# Patient Record
Sex: FEMALE | Race: WHITE
[De-identification: names, ages, dates, MRNs, and addresses within clinical notes are randomized per-mention and may not be internally consistent; named-entity substitution may affect disease eponyms.]

---

## 2019-09-03 ENCOUNTER — HOSPITAL ENCOUNTER (OUTPATIENT)
Dept: HOSPITAL 75 - RAD | Age: 84
End: 2019-09-03
Attending: NURSE PRACTITIONER
Payer: MEDICARE

## 2019-09-03 DIAGNOSIS — M47.816: Primary | ICD-10-CM

## 2019-09-03 DIAGNOSIS — M47.814: ICD-10-CM

## 2019-09-03 PROCEDURE — 72072 X-RAY EXAM THORAC SPINE 3VWS: CPT

## 2019-09-03 PROCEDURE — 72100 X-RAY EXAM L-S SPINE 2/3 VWS: CPT

## 2019-12-03 ENCOUNTER — HOSPITAL ENCOUNTER (OUTPATIENT)
Dept: HOSPITAL 75 - RAD | Age: 84
End: 2019-12-03
Attending: INTERNAL MEDICINE
Payer: MEDICARE

## 2019-12-03 DIAGNOSIS — M81.0: Primary | ICD-10-CM

## 2019-12-03 PROCEDURE — 77080 DXA BONE DENSITY AXIAL: CPT

## 2019-12-03 NOTE — DIAGNOSTIC IMAGING REPORT
INDICATION: 84-year-old postmenopausal female.



COMPARISON: None



FINDINGS:



AP Spine L1-L4:  

[BMD (g/cm2): 0.832] [T-Score: -3.1] [Z-Score: -0.7]

[BMD Previous: N/A] [BMD % Change: N/A]



LT Hip Neck:       

[BMD (g/cm2): 0.770] [T-Score: -1.9] [Z-Score: 0.8]



LT Hip Total:       

[BMD (g/cm2):0.821] [T-Score:-1.5] [Z-Score: 1.1]

[BMD Previous: N/A] [BMD % Change: N/A]



RT Hip Neck:      

[BMD (g/cm2):0.819] [T-Score:-1.6] [Z-Score:1.1]



RT Hip Total:      

[BMD (g/cm2):0.786] [T-score:-1.8] [Z-Score:0.9]

[BMD Previous:N/A] [BMD % Change:N/A]



*Indicates significant change from prior examination based on 95%

confidence level.



World Health Organization criteria for BMD interpretation

classify patients as Normal (T-score at or above -1.0),

Osteopenic (T-score between -1.0 and -2.5) or Osteoporotic

(T-score at or below -2.5).



LIMITATIONS AND MODIFICATION:  None.





IMPRESSION:

1. Osteoporosis.

2. Baseline examination.

3. See below National Osteoporosis Foundation guidelines on when

to potentially initiate pharmacologic therapy. 



Based on the National Osteoporosis Foundation Guidelines,

pharmacologic treatment should be initiated in any of the

following, unless clinical conditions suggest otherwise:



*  Any patient with prior fragility fracture of the hip or

vertebrae. A spine fracture indicates 5X risk for subsequent

spine fracture and 2X risk for subsequent hip fracture.



*  Osteoporosis (T-score <-2.5).



*  Postmenopausal women and men age 50 and older with low bone

mass/osteopenia (T-score between -1.0 and -2.5) by DXA and

10-year major osteoporotic fracture greater than 20% or a 10-year

probability of hip fracture greater than 3%. These fracture risks

are supplied above in the FRAX score, if applicable.



*  Clinician judgement and/or patient preferences may indicate

treatment for people with 10-year fracture probabilities above or

below these levels.



Dictated by: 



  Dictated on workstation # [id number]

## 2020-01-09 ENCOUNTER — HOSPITAL ENCOUNTER (OUTPATIENT)
Dept: HOSPITAL 75 - RAD | Age: 85
End: 2020-01-09
Attending: INTERNAL MEDICINE
Payer: MEDICARE

## 2020-01-09 DIAGNOSIS — F29: ICD-10-CM

## 2020-01-09 DIAGNOSIS — R41.82: ICD-10-CM

## 2020-01-09 DIAGNOSIS — I63.81: Primary | ICD-10-CM

## 2020-01-09 DIAGNOSIS — R25.1: ICD-10-CM

## 2020-01-09 DIAGNOSIS — G31.89: ICD-10-CM

## 2020-01-09 DIAGNOSIS — M81.0: ICD-10-CM

## 2020-01-09 PROCEDURE — 70450 CT HEAD/BRAIN W/O DYE: CPT

## 2020-01-09 NOTE — DIAGNOSTIC IMAGING REPORT
INDICATION: Tremors and altered mental status.



TECHNIQUE: Multiple contiguous axial images were obtained through

the brain without the use of intravenous contrast. Auto Exposure

Controls were utilized during the CT exam to meet ALARA standards

for radiation dose reduction. 



COMPARISON: Comparison made with 12/31/2018.



FINDINGS: Diffuse atrophic changes are again noted. There are

patchy low-density changes in the deep white matter compatible

with chronic ischemic change. There is an old lacunar infarct in

the left caudate nucleus. There is no definite acute appearing

abnormality. Ventricles are normal in size and position.

Calvarial windows appear unremarkable. Visualized portions of

orbits and sinuses are normal.



IMPRESSION:

Chronic findings as above with no acute abnormality.



Dictated by: 



  Dictated on workstation # [id number]

## 2020-10-25 ENCOUNTER — HOSPITAL ENCOUNTER (EMERGENCY)
Dept: HOSPITAL 75 - ER | Age: 85
LOS: 1 days | Discharge: HOME | End: 2020-10-26
Payer: MEDICARE

## 2020-10-25 VITALS — BODY MASS INDEX: 19.53 KG/M2 | HEIGHT: 62.99 IN | WEIGHT: 110.23 LBS

## 2020-10-25 DIAGNOSIS — Z83.3: ICD-10-CM

## 2020-10-25 DIAGNOSIS — R41.0: Primary | ICD-10-CM

## 2020-10-25 DIAGNOSIS — Z79.82: ICD-10-CM

## 2020-10-25 DIAGNOSIS — Z88.8: ICD-10-CM

## 2020-10-25 LAB
ALBUMIN SERPL-MCNC: 3.5 GM/DL (ref 3.2–4.5)
ALP SERPL-CCNC: 49 U/L (ref 40–136)
ALT SERPL-CCNC: 31 U/L (ref 0–55)
APTT BLD: 27 SEC (ref 24–35)
APTT PPP: YELLOW S
BACTERIA #/AREA URNS HPF: NEGATIVE /HPF
BASOPHILS # BLD AUTO: 0 10^3/UL (ref 0–0.1)
BASOPHILS NFR BLD AUTO: 0 % (ref 0–10)
BILIRUB SERPL-MCNC: 0.5 MG/DL (ref 0.1–1)
BILIRUB UR QL STRIP: NEGATIVE
BUN/CREAT SERPL: 25
CALCIUM SERPL-MCNC: 8.8 MG/DL (ref 8.5–10.1)
CHLORIDE SERPL-SCNC: 105 MMOL/L (ref 98–107)
CO2 SERPL-SCNC: 23 MMOL/L (ref 21–32)
CREAT SERPL-MCNC: 1.11 MG/DL (ref 0.6–1.3)
D DIMER PPP FEU-MCNC: 0.57 UG/ML (ref 0–0.49)
EOSINOPHIL # BLD AUTO: 0.2 10^3/UL (ref 0–0.3)
EOSINOPHIL NFR BLD AUTO: 2 % (ref 0–10)
FIBRINOGEN PPP-MCNC: (no result) MG/DL
GFR SERPLBLD BASED ON 1.73 SQ M-ARVRAT: 47 ML/MIN
GLUCOSE SERPL-MCNC: 102 MG/DL (ref 70–105)
GLUCOSE UR STRIP-MCNC: NEGATIVE MG/DL
HCT VFR BLD CALC: 37 % (ref 35–52)
HGB BLD-MCNC: 12.1 G/DL (ref 11.5–16)
INR PPP: 1 (ref 0.8–1.4)
KETONES UR QL STRIP: NEGATIVE
LEUKOCYTE ESTERASE UR QL STRIP: (no result)
LYMPHOCYTES # BLD AUTO: 4.2 10^3/UL (ref 1–4)
LYMPHOCYTES NFR BLD AUTO: 48 % (ref 12–44)
MANUAL DIFFERENTIAL PERFORMED BLD QL: YES
MCH RBC QN AUTO: 29 PG (ref 25–34)
MCHC RBC AUTO-ENTMCNC: 32 G/DL (ref 32–36)
MCV RBC AUTO: 90 FL (ref 80–99)
MONOCYTES # BLD AUTO: 1 10^3/UL (ref 0–1)
MONOCYTES NFR BLD AUTO: 11 % (ref 0–12)
NEUTROPHILS # BLD AUTO: 3.4 10^3/UL (ref 1.8–7.8)
NEUTROPHILS NFR BLD AUTO: 39 % (ref 42–75)
NITRITE UR QL STRIP: NEGATIVE
PH UR STRIP: 6 [PH] (ref 5–9)
PLATELET # BLD: 186 10^3/UL (ref 130–400)
PMV BLD AUTO: 8.8 FL (ref 9–12.2)
POTASSIUM SERPL-SCNC: 3.8 MMOL/L (ref 3.6–5)
PROT SERPL-MCNC: 6.5 GM/DL (ref 6.4–8.2)
PROT UR QL STRIP: NEGATIVE
PROTHROMBIN TIME: 13.7 SEC (ref 12.2–14.7)
RBC #/AREA URNS HPF: (no result) /HPF
SODIUM SERPL-SCNC: 139 MMOL/L (ref 135–145)
SP GR UR STRIP: 1.02 (ref 1.02–1.02)
SQUAMOUS #/AREA URNS HPF: (no result) /HPF
WBC # BLD AUTO: 8.8 10^3/UL (ref 4.3–11)
WBC #/AREA URNS HPF: (no result) /HPF

## 2020-10-25 PROCEDURE — 85730 THROMBOPLASTIN TIME PARTIAL: CPT

## 2020-10-25 PROCEDURE — 85610 PROTHROMBIN TIME: CPT

## 2020-10-25 PROCEDURE — 84484 ASSAY OF TROPONIN QUANT: CPT

## 2020-10-25 PROCEDURE — 51701 INSERT BLADDER CATHETER: CPT

## 2020-10-25 PROCEDURE — 36415 COLL VENOUS BLD VENIPUNCTURE: CPT

## 2020-10-25 PROCEDURE — 93005 ELECTROCARDIOGRAM TRACING: CPT

## 2020-10-25 PROCEDURE — 85027 COMPLETE CBC AUTOMATED: CPT

## 2020-10-25 PROCEDURE — 71045 X-RAY EXAM CHEST 1 VIEW: CPT

## 2020-10-25 PROCEDURE — 81000 URINALYSIS NONAUTO W/SCOPE: CPT

## 2020-10-25 PROCEDURE — 85379 FIBRIN DEGRADATION QUANT: CPT

## 2020-10-25 PROCEDURE — 85007 BL SMEAR W/DIFF WBC COUNT: CPT

## 2020-10-25 PROCEDURE — 93041 RHYTHM ECG TRACING: CPT

## 2020-10-25 PROCEDURE — 70450 CT HEAD/BRAIN W/O DYE: CPT

## 2020-10-25 PROCEDURE — 80053 COMPREHEN METABOLIC PANEL: CPT

## 2020-10-25 NOTE — ED NEUROLOGICAL PROBLEM
General


Chief Complaint:  Neurological Problems


Stated Complaint:  UPPER BODY/FACIAL NUMBNESS


Nursing Triage Note:  


PT TO ED W/ C/O UPPER LIP ET BILAT HAND NUMBNESS ONSET TODAY.  PER CARETAKER, PT




ALSO VOICED SOME CONFUSION TO AS SHE DID NOT KNOW WHERE THE BATHROOM WAS IN HER 


HOME OF 8 YRS.  PT AWAKE, A/OX3 AT THIS TIME, ANSWERS ALL QUESTIONS 


APPROPRIATELY AT THIS TIME.  NO OTHER C/O VOICED.


Nursing Sepsis Screen:  No Definite Risk


Source:  patient


Exam Limitations:  no limitations





History of Present Illness


Date Seen by Provider:  Oct 25, 2020


Time Seen by Provider:  23:15


Initial Comments


Here with caregiver reports that the patient had reported numbness to her face 

and left hand today but also has had some increased confusion over the past day 

or 2. Noted to have blood pressure elevation that is not typical for her. Blood 

pressure has been 150s to 170s. She is not on antihypertensive medicines and 

usually does not have high blood pressure. Patient lives at home with caregivers

and ambulates via walker. She was able to ambulate to the room under her own 

power using the walker without steadiness difficulties. Patient denies chest 

pain, breathing problems, nausea, vomiting or diarrhea. Caregiver reports that 

she has not been eating well but that is not a new issue. Did have urine checked

a week ago that was negative and she is currently being treated with doxycycline

for an eye infection.


Timing/Duration:  24 hours, waxing and waning


Severity:  mild


Associated Symptoms:  No fever/chills, No nausea/vomiting; paresthesia; No 

seizures, No trouble walking, No weakness





Allergies and Home Medications


Allergies


Coded Allergies:  


     quetiapine (Verified  Adverse Reaction, Unknown, 2/11/19)


   


   confusion





Home Medications


Acetaminophen 650 Mg Tablet.er, 650 MG PO BID, (Reported)


Amoxicillin/Potassium Clav 1 Each Tablet, 1 EACH PO BID


   Prescribed by: JASON RODRIGUEZ on 1/3/19 0942


Aspirin 81 Mg Tablet.dr, 81 MG PO DAILY, (Reported)


Cefuroxime Axetil 250 Mg Tablet, 250 MG PO BID


   Prescribed by: DANELLE MENENDEZ on 2/11/19 1827


Escitalopram Oxalate 10 Mg Tablet, 10 MG PO DAILY, (Reported)


Hydrocodone/Acetaminophen 1 Each Tablet, 1 EACH PO Q6H PRN for PAIN-MODERATE


   Prescribed by: DANELLE MENENDEZ on 2/11/19 1737


Omega 3 Polyunsat Fatty Acids 1,000 Mg Cap, 1,000 MG PO DAILY, (Reported)





Patient Home Medication List


Home Medication List Reviewed:  Yes





Review of Systems


Review of Systems


Constitutional:  see HPI; No chills, No fever


Eyes:  See HPI, Decreased Acuity (chronic); Denies Pain


Ears, Nose, Mouth, Throat:  no symptoms reported


Respiratory:  no symptoms reported


Cardiovascular:  No chest pain, No edema


Gastrointestinal:  No abdominal pain, No nausea, No vomiting


Genitourinary:  no symptoms reported (seen 01 home it more theoretical that was)


Skin:  no symptoms reported


Psychiatric/Neurological:  See HPI, Numbness, Tingling





All Other Systems Reviewed


Negative Unless Noted:  Yes





Past Medical-Social-Family Hx


Past Med/Social Hx:  Reviewed Nursing Past Med/Soc Hx (doing well)


Patient Social History


Alcohol Use:  Denies Use


Recreational Drug Use:  No


Smoking Status:  Never a Smoker


Recent Foreign Travel:  No


Contact w/Someone Who Travel:  No


Recent Infectious Disease Expo:  No


Recent Hopitalizations:  No


Physical Abuse:  No


Sexual Abuse:  No


Mistreated:  No


Fear:  No





Immunizations Up To Date


Tetanus Booster (TDap):  Unknown


PED Vaccines UTD:  No


Date of Pneumonia Vaccine:  Oct 1, 2017


Date of Influenza Vaccine:  Oct 1, 2017





Seasonal Allergies


Seasonal Allergies:  No





Past Medical History


Surgeries:  No


Respiratory:  No


Currently Using CPAP:  No


Currently Using BIPAP:  No


Cardiac:  Yes (SEES DR. BUSBY UNSURE WHY)


Hypertension


Neurological:  Yes


Dementia


Reproductive Disorders:  No


Female Reproductive Disorders:  Denies


Sexually Transmitted Disease:  No


HIV/AIDS:  No


Genitourinary:  Yes


UTI-Chronic


Gastrointestinal:  No


Musculoskeletal:  Yes


Fractures, Gout


Endocrine:  Yes


Hyperthyroidism


HEENT:  Yes


Cataract


Loss of Vision:  Denies


Hearing Impairment:  Hard of Hearing


Cancer:  No


Psychosocial:  No


Integumentary:  No


Recent Skin Changes


Blood Disorders:  No


Adverse Reaction/Blood Tranf:  No





Family Medical History


Reviewed Nursing Family Hx





Dementia


  G8 SISTER


Diabetes mellitus


  19 FATHER


No Pertinent Family Hx, Diabetes, Psychiatric Problems





Physical Exam


Vital Signs





Vital Signs - First Documented








 10/25/20





 23:08


 


Temp 35.8


 


Pulse 83


 


Resp 16


 


B/P (MAP) 192/81 (118)


 


Pulse Ox 98


 


O2 Delivery Room Air





Capillary Refill : Less Than 3 Seconds


Height, Weight, BMI


Height: 5'4.00"


Weight: 117lbs. 8.0oz. 53.741597pl; 19.00 BMI


Method:Stated


General Appearance:  WD/WN, no apparent distress, thin


HEENT:  PERRL/EOMI, pharynx normal


Neck:  full range of motion, supple


Respiratory:  lungs clear, normal breath sounds


Cardiovascular:  regular rate, rhythm, no murmur


Gastrointestinal:  non tender, soft


Extremities:  non-tender, normal inspection


Neurologic/Psychiatric:  alert, oriented x 3


Crainal Nerves:  normal speech


Coordination/Gait:  normal finger to nose, normal gait


Motor/Sensory:  no motor deficit, other (tingling reported to the left hand and 

upper lip but otherwise sensation intact and equal throughout the rest of the 

body)


Skin:  normal color, warm/dry





Progress/Results/Core Measures


Results/Orders


Lab Results





Laboratory Tests








Test


 10/25/20


23:16 10/25/20


23:23 Range/Units


 


 


White Blood Count


 8.8 


 


 4.3-11.0


10^3/uL


 


Red Blood Count


 4.17 


 


 3.80-5.11


10^6/uL


 


Hemoglobin 12.1   11.5-16.0  g/dL


 


Hematocrit 37   35-52  %


 


Mean Corpuscular Volume 90   80-99  fL


 


Mean Corpuscular Hemoglobin 29   25-34  pg


 


Mean Corpuscular Hemoglobin


Concent 32 


 


 32-36  g/dL





 


Red Cell Distribution Width 14.4   10.0-14.5  %


 


Platelet Count


 186 


 


 130-400


10^3/uL


 


Mean Platelet Volume 8.8 L  9.0-12.2  fL


 


Immature Granulocyte % (Auto) 0    %


 


Neutrophils (%) (Auto) 39 L  42-75  %


 


Lymphocytes (%) (Auto) 48 H  12-44  %


 


Monocytes (%) (Auto) 11   0-12  %


 


Eosinophils (%) (Auto) 2   0-10  %


 


Basophils (%) (Auto) 0   0-10  %


 


Neutrophils # (Auto)


 3.4 


 


 1.8-7.8


10^3/uL


 


Lymphocytes # (Auto)


 4.2 H


 


 1.0-4.0


10^3/uL


 


Monocytes # (Auto)


 1.0 


 


 0.0-1.0


10^3/uL


 


Eosinophils # (Auto)


 0.2 


 


 0.0-0.3


10^3/uL


 


Basophils # (Auto)


 0.0 


 


 0.0-0.1


10^3/uL


 


Immature Granulocyte # (Auto)


 0.0 


 


 0.0-0.1


10^3/uL


 


Neutrophils % (Manual) 39    %


 


Lymphocytes % (Manual) 41    %


 


Monocytes % (Manual) 12    %


 


Atypical Lymphocytes 8    %


 


Blood Morphology Comment NORMAL    


 


Prothrombin Time 13.7   12.2-14.7  SEC


 


INR Comment 1.0   0.8-1.4  


 


Activated Partial


Thromboplast Time 27 


 


 24-35  SEC





 


D-Dimer


 0.57 H


 


 0.00-0.49


UG/ML


 


Sodium Level 139   135-145  MMOL/L


 


Potassium Level 3.8   3.6-5.0  MMOL/L


 


Chloride Level 105     MMOL/L


 


Carbon Dioxide Level 23   21-32  MMOL/L


 


Anion Gap 11   5-14  MMOL/L


 


Blood Urea Nitrogen 28 H  7-18  MG/DL


 


Creatinine


 1.11 


 


 0.60-1.30


MG/DL


 


Estimat Glomerular Filtration


Rate 47 


 


  





 


BUN/Creatinine Ratio 25    


 


Glucose Level 102     MG/DL


 


Calcium Level 8.8   8.5-10.1  MG/DL


 


Corrected Calcium 9.2   8.5-10.1  MG/DL


 


Total Bilirubin 0.5   0.1-1.0  MG/DL


 


Aspartate Amino Transf


(AST/SGOT) 24 


 


 5-34  U/L





 


Alanine Aminotransferase


(ALT/SGPT) 31 


 


 0-55  U/L





 


Alkaline Phosphatase 49     U/L


 


Troponin I < 0.028   <0.028  NG/ML


 


Total Protein 6.5   6.4-8.2  GM/DL


 


Albumin 3.5   3.2-4.5  GM/DL


 


Urine Color  YELLOW   


 


Urine Clarity  SL CLOUDY   


 


Urine pH  6.0  5-9  


 


Urine Specific Gravity  1.025 H 1.016-1.022  


 


Urine Protein  NEGATIVE  NEGATIVE  


 


Urine Glucose (UA)  NEGATIVE  NEGATIVE  


 


Urine Ketones  NEGATIVE  NEGATIVE  


 


Urine Nitrite  NEGATIVE  NEGATIVE  


 


Urine Bilirubin  NEGATIVE  NEGATIVE  


 


Urine Urobilinogen  0.2  < = 1.0  MG/DL


 


Urine Leukocyte Esterase  TRACE H NEGATIVE  


 


Urine RBC (Auto)  TRACE-I  NEGATIVE  


 


Urine RBC  0-2   /HPF


 


Urine WBC  2-5   /HPF


 


Urine Squamous Epithelial


Cells 


 2-5 


  /HPF





 


Urine Crystals  NONE   /LPF


 


Urine Bacteria  NEGATIVE   /HPF


 


Urine Casts  NONE   /LPF


 


Urine Mucus  NEGATIVE   /LPF


 


Urine Culture Indicated  NO   








My Orders





Orders - NARENDRA ROSAS MD


Cbc With Automated Diff (10/25/20 23:19)


Protime With Inr (10/25/20 23:19)


Partial Thromboplastin Time (10/25/20 23:19)


Comprehensive Metabolic Panel (10/25/20 23:19)


Fibrin Degradation Products (10/25/20 23:19)


Troponin I (10/25/20 23:19)


Ua Culture If Indicated (10/25/20 23:19)


Chest 1 View, Ap/Pa Only (10/25/20 23:19)


Ekg Tracing (10/25/20 23:19)


Nothing By Mouth (10/26/20 Breakfast)


Accucheck Stat ONCE (10/25/20 23:19)


Ed Iv/Invasive Line Start (10/25/20 23:19)


Vital Signs Stroke Patient Q15M (10/25/20 23:19)


Ct Head Wo-R/O Stroke (10/25/20 23:19)


O2 (10/25/20 23:19)


Intake & Output 06,14,22 (10/25/20 23:19)


Monitor-Rhythm Ecg Trace Only (10/25/20 23:19)


Dysphagia Screening Tool (10/25/20 23:19)


Lipid Panel (10/26/20 06:00)


Ns Iv 500 Ml (Sodium Chloride 0.9%) (10/25/20 23:19)


Straight Cath For Spec.-Adult (10/25/20 23:19)


Manual Differential (10/25/20 23:16)





Medications Given in ED





Current Medications








 Medications  Dose


 Ordered  Sig/Familia


 Route  Start Time


 Stop Time Status Last Admin


Dose Admin


 


 Sodium Chloride  500 ml @ 0


 mls/hr  Q0M ONCE


 IV  10/25/20 23:19


 10/25/20 23:23 DC 10/25/20 23:27


0 MLS/HR








Vital Signs/I&O











 10/25/20





 23:08


 


Temp 35.8


 


Pulse 83


 


Resp 16


 


B/P (MAP) 192/81 (118)


 


Pulse Ox 98


 


O2 Delivery Room Air














Blood Pressure Mean:                    118











Progress


Progress Note :  


Progress Note


Seen and evaluated. IV, labs, EKG and chest x-ray ordered. CT head ordered. We 

did straight catheter UA to rule out urinary tract infection as cause. Normal 

saline 500 mL bolus. Monitor patient.


Initial ECG Impression Date:  Oct 25, 2020


Initial ECG Impression Time:  23:50


Initial ECG Rate:  74


Initial ECG Rhythm:  Normal Sinus


Initial ECG Comparisson:  Unchanged


Comment


Sinus rhythm with leftward axis. No evidence of ST elevation MI. Interpreted by 

me.





Diagnostic Imaging





   Diagonstic Imaging:  CT


   Plain Films/CT/US/NM/MRI:  head


Comments


No acute findings in the head/brain


   Reviewed:  Reviewed [place] Study, Reviewed by Me








   Diagonstic Imaging:  Xray


   Plain Films/CT/US/NM/MRI:  chest


Comments


No acute findings





Departure


Impression





   Primary Impression:  


   Confusion


Disposition:  01 HOME, SELF-CARE


Condition:  Stable





Departure-Patient Inst.


Decision time for Depature:  00:54


Referrals:  


NICOLASA MACHADO DO (PCP/Family)


Primary Care Physician


Patient Instructions:  Paresthesia (DC), Delirium (Confusion) (DC)





Add. Discharge Instructions:  








All discharge instructions reviewed with patient and/or family. Voiced 

understanding.





Continue home medications as previously prescribed. Follow-up with your Dr. in a

few days for recheck. Monitor and record blood pressure twice daily for the next

several days and discuss this with the doctor. Call in the morning for 

appointment. Return for worse pain, weakness, vision or balance problems, fever 

or other concerns as needed.





Copy


Copies To 1:   NICOLASA MACHADO TIMOTHY D MD          Oct 25, 2020 23:49

## 2020-10-26 VITALS — SYSTOLIC BLOOD PRESSURE: 177 MMHG | DIASTOLIC BLOOD PRESSURE: 88 MMHG

## 2020-10-26 LAB
MONOCYTES NFR BLD: 12 %
NEUTS BAND NFR BLD MANUAL: 39 %
RBC MORPH BLD: NORMAL
VARIANT LYMPHS NFR BLD MANUAL: 41 %
VARIANT LYMPHS NFR BLD MANUAL: 8 %

## 2020-10-26 NOTE — DIAGNOSTIC IMAGING REPORT
INDICATION:  Altered mental status and confusion.



Portable AP image of the chest is obtained. Since 02/11/2019,

heart size and pulmonary vascularity remain within normal limits.

There is no evidence of pneumothorax or consolidation. Multiple

old right rib fractures are present.



IMPRESSION: No acute abnormality is detected.



Dictated by: 



  Dictated on workstation # [id number]

## 2020-10-26 NOTE — DIAGNOSTIC IMAGING REPORT
EXAMINATION: CT head without contrast.



TECHNIQUE: Multiple contiguous axial images were obtained through

the brain without the use of intravenous contrast. All CT scans

use one or more of the following dose optimizing techniques:

automated exposure control, MA and/or KvP adjustment based on a

patient size and exam type, or iterative reconstruction. 



HISTORY: Neuro deficit



COMPARISON: CT head 01/09/2020



FINDINGS: 



Mild diffuse cerebral volume loss with proportional enlargement

of the ventricles and sulci. Moderate hypodensities throughout

the supratentorial white matter posterior hemispheres. Chronic

bilateral basal ganglia lacunar infarcts. No acute intracranial

hemorrhage or abnormal extra-axial fluid collections are present.





Calcification of the intracranial ICAs. No hyperdense vessel.



The calvarium is intact. The mastoid air cells are clear. The

visualized paranasal sinuses are clear. Surgical changes from

bilateral cataract repair.



IMPRESSION:



1. No acute intracranial abnormality.

2. Chronic senescent changes.

3. Agree with preliminary interpretation.



Dictated by: 



  Dictated on workstation # [id number]

## 2020-10-26 NOTE — NUR
PT DISCHARGED TO HOME W/ INSTR.  PT TO F/U W/ PCP ET RETURN IF SYMPTOMS CHANGE 
OR GET WORSE.  UNDERSTANDING VOICED.

## 2021-03-17 ENCOUNTER — HOSPITAL ENCOUNTER (OUTPATIENT)
Dept: HOSPITAL 75 - RAD | Age: 86
End: 2021-03-17
Attending: INTERNAL MEDICINE
Payer: MEDICARE

## 2021-03-17 DIAGNOSIS — G31.9: ICD-10-CM

## 2021-03-17 DIAGNOSIS — F29: Primary | ICD-10-CM

## 2021-03-17 DIAGNOSIS — R90.82: ICD-10-CM

## 2021-03-17 PROCEDURE — 70450 CT HEAD/BRAIN W/O DYE: CPT

## 2021-03-17 NOTE — DIAGNOSTIC IMAGING REPORT
PROCEDURE: CT head without contrast.



TECHNIQUE: Multiple contiguous axial images were obtained through

the brain without the use of intravenous contrast. Auto Exposure

Controls were utilized during the CT exam to meet ALARA standards

for radiation dose reduction. 



INDICATION: Visual hallucinations. 



COMPARISON: 10/25/2020. 



FINDINGS: Some old ischemic changes in the left basal ganglia as

well as chronic periventricular white matter small vessel

disease, atherosclerotic calcifications and mild cerebral

cortical atrophy, all identical in appearance to the prior. No

focal or generalized cerebral edema. No hemorrhage and no

evidence for an elevation due to the intracranial pressures.

There is no mass or mass effect. Orbits, sinuses and calvarium

appear nonacute. 



IMPRESSION: Stable chronic findings. 



Dictated by: 



  Dictated on workstation # [id number]

## 2021-04-07 ENCOUNTER — HOSPITAL ENCOUNTER (EMERGENCY)
Dept: HOSPITAL 75 - ER | Age: 86
Discharge: HOME | End: 2021-04-07
Payer: MEDICARE

## 2021-04-07 VITALS — BODY MASS INDEX: 20.31 KG/M2 | HEIGHT: 62.99 IN | WEIGHT: 114.64 LBS

## 2021-04-07 VITALS — DIASTOLIC BLOOD PRESSURE: 88 MMHG | SYSTOLIC BLOOD PRESSURE: 168 MMHG

## 2021-04-07 DIAGNOSIS — S01.01XA: Primary | ICD-10-CM

## 2021-04-07 DIAGNOSIS — Z88.8: ICD-10-CM

## 2021-04-07 DIAGNOSIS — I10: ICD-10-CM

## 2021-04-07 DIAGNOSIS — R40.2410: ICD-10-CM
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
## 2021-04-07 DIAGNOSIS — Z83.3: ICD-10-CM

## 2021-04-07 PROCEDURE — 70450 CT HEAD/BRAIN W/O DYE: CPT

## 2021-04-07 PROCEDURE — 12002 RPR S/N/AX/GEN/TRNK2.6-7.5CM: CPT

## 2021-04-07 NOTE — DIAGNOSTIC IMAGING REPORT
EXAMINATION: CT head without contrast.



TECHNIQUE: Multiple contiguous axial images were obtained through

the brain without the use of intravenous contrast. All CT scans

use one or more of the following dose optimizing techniques:

automated exposure control, MA and/or KvP adjustment based on a

patient size and exam type, or iterative reconstruction. 



HISTORY: Fall.



COMPARISON: 03/17/2021.



FINDINGS: 



The gray-white matter differentiation is normal. No mass effect

or midline shift. 



There is age related cerebral atrophy with ex vacuo dilation of

the ventricles. Periventricular white matter hypoattenuation is

in keeping with chronic small vessel ischemic changes. Basilar

cisterns are patent. There are no intra- or extra-axial fluid

collections. There is no intracranial hemorrhage. 



The orbits are normal. Paranasal sinuses are normal. Mastoid air

cells are clear. No soft tissue abnormality is seen. No osseous

lesions or fractures are seen.



IMPRESSION:



1. No acute intracranial abnormality.



Dictated by: 



  Dictated on workstation # [id number]

## 2021-04-07 NOTE — ED FALL/INJURY
General


Chief Complaint:  Trauma-Non Activation


Stated Complaint:  FELL


Nursing Triage Note:  


SEE TRIAGE





History of Present Illness


Date Seen by Provider:  2021


Time Seen by Provider:  11:50


Initial Comments


86-year-old  female reports she was stooping over to feed her dog when 

she lost her balance and fell backwards landing on her buttocks and incurring a 

laceration to her scalp.  She was home with her son denies loss of 

consciousness, fall unwitnessed.  Her home health arrived shortly after and 

brought her here. Mild soreness on tailbone.  She was able to ambulate without 

difficulty.  She denies any other areas of concern.


Location Injury Occurred:  HOME


Occurred:  just prior to arrival


Severity:  mild


Injuries/Pain Location:  head (Scalp laceration)


Context:  lost balance


Loss of Consciousness:  no loss of consciousness (Fall was not witnessed)


Associated Symptoms (Fall):  Denies Symptoms; No Abdominal Pain, No Chest Pain, 

No Headache, No Lightheadedness, No Muscle Spasms, No Nausea/Vomiting, No Neck 

Pain, No Shortness of Air, No Slurred Speech, No Trouble Walking, No Vision 

Changes





Allergies and Home Medications


Allergies


Coded Allergies:  


     quetiapine (Verified  Adverse Reaction, Unknown, 19)


   confusion





Patient Home Medication List


Home Medication List Reviewed:  Yes





Review of Systems


Review of Systems


Constitutional:  no symptoms reported, see HPI


Eyes:  No Symptoms Reported, See HPI


Ears, Nose, Mouth, Throat:  no symptoms reported, see HPI


Respiratory:  no symptoms reported, see HPI


Cardiovascular:  no symptoms reported, see HPI


Gastrointestinal:  no symptoms reported, see HPI


Genitourinary:  no symptoms reported, see HPI


Pregnant:  No


Musculoskeletal:  no symptoms reported, see HPI


Skin:  see HPI, other (3 cm laceration to scalp, occipital)





All Other Systems Reviewed


Negative Unless Noted:  Yes





Past Medical-Social-Family Hx


Past Med/Social Hx:  Reviewed Nursing Past Med/Soc Hx


Patient Social History


Alcohol Use:  Denies Use


Smoking Status:  Never a Smoker


Recent Infectious Disease Expo:  No


Recent Hopitalizations:  No





Immunizations Up To Date


Tetanus Booster (TDap):  Unknown


PED Vaccines UTD:  No


Date of Pneumonia Vaccine:  Oct 1, 2017


Date of Influenza Vaccine:  Oct 1, 2017





Seasonal Allergies


Seasonal Allergies:  No





Past Medical History


Surgeries:  No


Respiratory:  No


Currently Using CPAP:  No


Currently Using BIPAP:  No


Cardiac:  Yes (SEES DR. BUSBY UNSURE WHY)


Hypertension


Neurological:  Yes


Dementia


Reproductive Disorders:  No


Female Reproductive Disorders:  Denies


Sexually Transmitted Disease:  No


HIV/AIDS:  No


Genitourinary:  Yes


UTI-Chronic


Gastrointestinal:  No


Musculoskeletal:  Yes


Fractures, Gout


Endocrine:  Yes


Hyperthyroidism


HEENT:  Yes


Cataract


Loss of Vision:  Denies


Hearing Impairment:  Hard of Hearing


Cancer:  No


Psychosocial:  No


Integumentary:  No


Recent Skin Changes


Blood Disorders:  No


Adverse Reaction/Blood Tranf:  No





Family Medical History





Dementia


  G8 SISTER


Diabetes mellitus


  19 FATHER


No Pertinent Family Hx, Diabetes, Psychiatric Problems





Physical Exam


Vital Signs





Vital Signs - First Documented








 21





 11:15


 


Temp 36.5


 


Pulse 70


 


Resp 18


 


B/P (MAP) 219/90 (133)


 


Pulse Ox 97





Capillary Refill : Less Than 3 Seconds


Height, Weight, BMI


Height: 5'4.00"


Weight: 117lbs. 8.0oz. 53.719504se; 20.00 BMI


Method:Stated


General Appearance:  WD/WN, no apparent distress


HEENT:  PERRL/EOMI, normal ENT inspection, TMs normal, pharynx normal


Neck:  non-tender, full range of motion, supple


Cardiovascular:  normal peripheral pulses, regular rate, rhythm, no edema


Respiratory:  chest non-tender, lungs clear, normal breath sounds


Gastrointestinal:  normal bowel sounds, non tender, soft


Back:  normal inspection, no CVA tenderness, no vertebral tenderness


Extremities:  normal range of motion, non-tender, normal inspection, no pedal 

edema, no calf tenderness, normal capillary refill, pelvis stable


Neurologic/Psychiatric:  no motor/sensory deficits, alert, normal mood/affect, 

oriented x 3


Skin:  normal color, warm/dry


No active bleeding from laceration to occipital region of scalp.  3 cm in 

length.





McGrath Coma Score


Best Eye Response:  (4) Open Spontaneously


Best Verbal Response:  (5) Oriented


Best Motor Response:  (6) Obeys Commands


Leticia Total:  15





Procedures/Interventions





   Wound Location:  Scalp


   Wound Length (cm):  3


   Wound's Depth, Shape:  superficial


   Wound Explored:  clean


   Irrigated w/ Saline (ccs):  500


   Staple Repair:  Stapler Skin Precise


   Number of Sutures:  5


   Sterile Dressing Applied?:  No


Progress


Wound well approximated, patient tolerated procedure well.





Progress/Results/Core Measures


Results/Orders


My Orders





Orders - TAM YAÑEZ


Ct Head Wo (21 12:22)


Acetaminophen Tablet/Caplet (Tylenol  T (21 12:30)


Dipht,Pertuss(Acell),Tet Adult (Boostrix (21 12:30)





Medications Given in ED





Current Medications








 Medications  Dose


 Ordered  Sig/Familia


 Route  Start Time


 Stop Time Status Last Admin


Dose Admin


 


 Acetaminophen  650 mg  ONCE  ONCE


 PO  21 12:30


 21 12:31 DC 21 12:44


650 MG








Vital Signs/I&O











 21





 11:15


 


Temp 36.5


 


Pulse 70


 


Resp 18


 


B/P (MAP) 219/90 (133)


 


Pulse Ox 97














Blood Pressure Mean:                    133











Diagnostic Imaging





   Diagonstic Imaging:  CT


   Plain Films/CT/US/NM/MRI:  head


Comments


NAME:   DARRION GARCIA


[facility] REC#:   [id number]


ACCOUNT#:   [id number]


PT STATUS:   REG ER


:   1935


PHYSICIAN:   TAM YAÑEZ


ADMIT DATE:   21/ER


                                   ***Draft***


Date of Exam:21





CT HEAD WO








EXAMINATION: CT head without contrast.





TECHNIQUE: Multiple contiguous axial images were obtained through


the brain without the use of intravenous contrast. All CT scans


use one or more of the following dose optimizing techniques:


automated exposure control, MA and/or KvP adjustment based on a


patient size and exam type, or iterative reconstruction. 





HISTORY: Fall.





COMPARISON: 2021.





FINDINGS: 





The gray-white matter differentiation is normal. No mass effect


or midline shift. 





There is age related cerebral atrophy with ex vacuo dilation of


the ventricles. Periventricular white matter hypoattenuation is


in keeping with chronic small vessel ischemic changes. Basilar


cisterns are patent. There are no intra- or extra-axial fluid


collections. There is no intracranial hemorrhage. 





The orbits are normal. Paranasal sinuses are normal. Mastoid air


cells are clear. No soft tissue abnormality is seen. No osseous


lesions or fractures are seen.





IMPRESSION:





1. No acute intracranial abnormality.





  Dictated on workstation # [id number]








Dict:   21 1245


Trans:   21 1249


AS6 [id number]





Interpreted by:     MICHAEL ASENCIO MD


Electronically signed by:


   Reviewed:  Reviewed by Me





Departure


Impression





   Primary Impression:  


   Fall


   Qualified Codes:  W19.XXXA - Unspecified fall, initial encounter


   Additional Impression:  


   Laceration of scalp


   Qualified Codes:  S01.01XA - Laceration without foreign body of scalp, 

   initial encounter


Disposition:  01 HOME, SELF-CARE


Condition:  Improved





Departure-Patient Inst.


Decision time for Depature:  13:05


Referrals:  


NICOLASA MACHADO DO (PCP/Family)


Primary Care Physician


Patient Instructions:  Laceration Repair With Edgar (DC), Preventing Falls





Add. Discharge Instructions:  


Use assistance when needing to perform tasks that require bending over or 

stooping.


You may alternate between Tylenol 650 mg and ibuprofen 600 mg every 4 hours for 

headache or pain.


Progress activity as tolerated.


You may shower as normal, clean the area where the staples are with peroxide af

ter bathing.  Do not apply any ointments or other products to the staple area.


Watch for signs of infection: Redness, swelling, discolored drainage, foul 

smell.  Report these immediately to your primary care provider or return to the 

emergency department


Return to the emergency department in 7 to 10 days or your primary care provider

to have the staples removed.


Return to the emergency department for new, urgent healthcare needs.





All discharge instructions reviewed with patient and/or family. Voiced unders

tanding.











TAM YAÑEZ                   2021 13:05

## 2021-04-11 ENCOUNTER — HOSPITAL ENCOUNTER (EMERGENCY)
Dept: HOSPITAL 75 - ER | Age: 86
Discharge: HOME | End: 2021-04-11
Payer: MEDICARE

## 2021-04-11 VITALS — DIASTOLIC BLOOD PRESSURE: 91 MMHG | SYSTOLIC BLOOD PRESSURE: 158 MMHG

## 2021-04-11 VITALS — WEIGHT: 167.99 LBS | HEIGHT: 60.63 IN | BODY MASS INDEX: 32.13 KG/M2

## 2021-04-11 DIAGNOSIS — I10: ICD-10-CM

## 2021-04-11 DIAGNOSIS — R51.9: Primary | ICD-10-CM

## 2021-04-11 DIAGNOSIS — Z83.3: ICD-10-CM

## 2021-04-11 DIAGNOSIS — Z88.8: ICD-10-CM

## 2021-04-11 DIAGNOSIS — M53.3: ICD-10-CM

## 2021-04-11 LAB
ALBUMIN SERPL-MCNC: 3.7 GM/DL (ref 3.2–4.5)
ALP SERPL-CCNC: 57 U/L (ref 40–136)
ALT SERPL-CCNC: 27 U/L (ref 0–55)
BASOPHILS # BLD AUTO: 0 10^3/UL (ref 0–0.1)
BASOPHILS NFR BLD AUTO: 0 % (ref 0–10)
BILIRUB SERPL-MCNC: 0.6 MG/DL (ref 0.1–1)
BUN/CREAT SERPL: 25
CALCIUM SERPL-MCNC: 9 MG/DL (ref 8.5–10.1)
CHLORIDE SERPL-SCNC: 101 MMOL/L (ref 98–107)
CO2 SERPL-SCNC: 22 MMOL/L (ref 21–32)
CREAT SERPL-MCNC: 1 MG/DL (ref 0.6–1.3)
EOSINOPHIL # BLD AUTO: 0.1 10^3/UL (ref 0–0.3)
EOSINOPHIL NFR BLD AUTO: 1 % (ref 0–10)
GFR SERPLBLD BASED ON 1.73 SQ M-ARVRAT: 53 ML/MIN
GLUCOSE SERPL-MCNC: 158 MG/DL (ref 70–105)
HCT VFR BLD CALC: 40 % (ref 35–52)
HGB BLD-MCNC: 12.5 G/DL (ref 11.5–16)
LYMPHOCYTES # BLD AUTO: 1.6 10^3/UL (ref 1–4)
LYMPHOCYTES NFR BLD AUTO: 19 % (ref 12–44)
MANUAL DIFFERENTIAL PERFORMED BLD QL: NO
MCH RBC QN AUTO: 29 PG (ref 25–34)
MCHC RBC AUTO-ENTMCNC: 31 G/DL (ref 32–36)
MCV RBC AUTO: 92 FL (ref 80–99)
MONOCYTES # BLD AUTO: 0.5 10^3/UL (ref 0–1)
MONOCYTES NFR BLD AUTO: 6 % (ref 0–12)
NEUTROPHILS # BLD AUTO: 6.2 10^3/UL (ref 1.8–7.8)
NEUTROPHILS NFR BLD AUTO: 74 % (ref 42–75)
PLATELET # BLD: 157 10^3/UL (ref 130–400)
PMV BLD AUTO: 8.7 FL (ref 9–12.2)
POTASSIUM SERPL-SCNC: 4.2 MMOL/L (ref 3.6–5)
PROT SERPL-MCNC: 6.8 GM/DL (ref 6.4–8.2)
SODIUM SERPL-SCNC: 136 MMOL/L (ref 135–145)
WBC # BLD AUTO: 8.4 10^3/UL (ref 4.3–11)

## 2021-04-11 PROCEDURE — 85025 COMPLETE CBC W/AUTO DIFF WBC: CPT

## 2021-04-11 PROCEDURE — 72220 X-RAY EXAM SACRUM TAILBONE: CPT

## 2021-04-11 PROCEDURE — 84484 ASSAY OF TROPONIN QUANT: CPT

## 2021-04-11 PROCEDURE — 93005 ELECTROCARDIOGRAM TRACING: CPT

## 2021-04-11 PROCEDURE — 72170 X-RAY EXAM OF PELVIS: CPT

## 2021-04-11 PROCEDURE — 36415 COLL VENOUS BLD VENIPUNCTURE: CPT

## 2021-04-11 PROCEDURE — 70450 CT HEAD/BRAIN W/O DYE: CPT

## 2021-04-11 PROCEDURE — 80053 COMPREHEN METABOLIC PANEL: CPT

## 2021-04-11 NOTE — DIAGNOSTIC IMAGING REPORT
EXAM: SACRUM AND COCCYX



INDICATION: Fall.



COMPARISON: CT abdomen pelvis without contrast 02/11/2019.



FINDINGS: Chronic fracture deformities of the superior and

inferior pubic rami appear similar to the prior CT allowing for

differences in technique. No acute fractures are identified. The

sacrum is obscured by overlying tissues in the AP radiograph. No

fractures identified on the lateral.



IMPRESSION: No acute radiographic findings in the sacrum or

coccyx.



Dictated by: 



  Dictated on workstation # [id number]

## 2021-04-11 NOTE — DIAGNOSTIC IMAGING REPORT
PROCEDURE: CT head without contrast.



TECHNIQUE: Multiple contiguous axial images were obtained through

the brain without the use of intravenous contrast. Auto Exposure

Controls were utilized during the CT exam to meet ALARA standards

for radiation dose reduction. 



INDICATION:  Fall with headache and hypertension 



FINDINGS: There is prominence of the ventricles and sulci. There

is no hydrocephalus or cerebral edema. There is no midline shift

or mass-effect. There is no intracranial mass, hemorrhage, or

extra-axial fluid collection. There is some diffuse decreased

attenuation of the periventricular white matter which is

nonspecific. The visualized paranasal sinuses and mastoid air

cells are clear. There are no regional areas of decreased

attenuation appreciated to suggest an acute CVA.



IMPRESSION: 

1. No acute intracranial process.

2. Age-appropriate atrophy.

3. Decreased attenuation of the periventricular white matter

which is nonspecific, however, likely reflects senescent change

and/or chronic small vessel ischemic disease.



Dictated by: 



  Dictated on workstation # [id number]

## 2021-04-11 NOTE — DIAGNOSTIC IMAGING REPORT
INDICATION: Fall with pain.



FINDINGS: There are chronic-appearing fracture deformities of the

obturator rings bilaterally. Proximal femurs are intact. No acute

fracture or dislocation.



IMPRESSION: No acute fracture or dislocation.



Old fracture deformities of the obturator rings bilaterally.



Dictated by: 



  Dictated on workstation # [id number]

## 2021-05-25 ENCOUNTER — HOSPITAL ENCOUNTER (OUTPATIENT)
Dept: HOSPITAL 75 - RAD | Age: 86
End: 2021-05-25
Attending: INTERNAL MEDICINE
Payer: MEDICARE

## 2021-05-25 DIAGNOSIS — M47.814: Primary | ICD-10-CM

## 2021-05-25 PROCEDURE — 72072 X-RAY EXAM THORAC SPINE 3VWS: CPT

## 2021-05-25 NOTE — DIAGNOSTIC IMAGING REPORT
INDICATION:  PAIN IN THORACIC SPINE.



TECHNIQUE: 

 AP, Lateral and Swimmers imaging of the thoracic spine



CORRELATION STUDY: 

09/03/2019



FINDINGS:

Unchanged mild accentuated thoracic kyphosis is noted. The

thoracic vertebral body heights overall appear to be stable

without adverse interval change. No new compression deformity.

Multilevel degenerative changes of the disc space narrowing and

plate osteophyte formation. Also minimal leftward curvature of

the lower thoracic spine.



IMPRESSION: 

Mild multilevel degenerative changes of the thoracic spine.

Thoracic vertebral body heights overall appear unchanged. No

acute appearing compression deformity.



Dictated by: 



  Dictated on workstation # [id number]

## 2021-07-29 ENCOUNTER — HOSPITAL ENCOUNTER (OUTPATIENT)
Dept: HOSPITAL 75 - RAD | Age: 86
End: 2021-07-29
Attending: INTERNAL MEDICINE
Payer: MEDICARE

## 2021-07-29 DIAGNOSIS — C44.310: Primary | ICD-10-CM

## 2021-07-29 DIAGNOSIS — M47.814: ICD-10-CM

## 2021-07-29 PROCEDURE — 72072 X-RAY EXAM THORAC SPINE 3VWS: CPT

## 2021-07-29 PROCEDURE — 72110 X-RAY EXAM L-2 SPINE 4/>VWS: CPT

## 2021-07-29 NOTE — DIAGNOSTIC IMAGING REPORT
INDICATION: Basal cell carcinoma with thoracic pain.



AP and lateral views of the thoracic spine are obtained. 



Comparison is made to study of 05/25/2021.



Thoracic vertebral body heights are maintained. There is mild

lower thoracic disc space narrowing and endplate spurring however

no acute fracture or subluxations identified. There is no

evidence of paraspinous hematoma.



IMPRESSION: Stable mild thoracic spondylosis without acute

abnormality detected.



Dictated by: 



  Dictated on workstation # [id number]

## 2021-07-29 NOTE — DIAGNOSTIC IMAGING REPORT
Indication: Basal cell carcinoma and low back pain 



AP, oblique and lateral views of the lumbar spine are obtained

with comparison made to study of 09/03/2019.



Lumbar spinal curvature and alignment are stable and

unremarkable. There is unchanged narrowing of the T11-T12 disc

space with slight anterior wedging of T12 vertebral body. No

acute fracture or malalignment is identified.



IMPRESSION: Degenerative findings most pronounced in the lower

thoracic region without evidence of acute lumbar spinal

abnormality.



Dictated by: 



  Dictated on workstation # [id number]

## 2021-08-09 ENCOUNTER — HOSPITAL ENCOUNTER (OUTPATIENT)
Dept: HOSPITAL 75 - RAD | Age: 86
End: 2021-08-09
Attending: INTERNAL MEDICINE
Payer: MEDICARE

## 2021-08-09 DIAGNOSIS — M48.04: ICD-10-CM

## 2021-08-09 DIAGNOSIS — M47.814: Primary | ICD-10-CM

## 2021-08-09 DIAGNOSIS — M43.8X4: ICD-10-CM

## 2021-08-09 PROCEDURE — 72146 MRI CHEST SPINE W/O DYE: CPT

## 2021-08-09 NOTE — DIAGNOSTIC IMAGING REPORT
CLINICAL INDICATION: Patient has mid to lower spine pain.



EXAM: MRI of the thoracic spine performed without IV contrast.

Sequences include sagittal T2, sagittal T1, sagittal T2 fat-sat,

and axial T2.



COMPARISON: CT scan of the thoracic spine and lumbar spine

without contrast dated 02/11/2019. X-ray of the thoracic spine

dated 05/25/2021.



FINDINGS:

There is high T2/low T1 signal hypertrophic bony changes

involving the medial aspect of the right T9 rib which may

represent an acute or subacute fracture. Axial T2 sequence shows

that there may be some callous or periosteal reaction in the

region. This is best seen on the axial T2 sequence series 6,

image 30. There is stable bony thickening involving the medial

aspect of the right T10 rib with no abnormal signal consistent

with old healed fracture changes seen on comparison CT scan.



There are no other areas of abnormal bony marrow edema. There is

a chronic anterior wedge fracture deformity involving the T1

vertebra. There is mild compression deformity with Schmorl's

nodes involving the T9 and T12 vertebra. There is no retropulsed

component. There are degenerative spurs involving the thoracic

spine. There is facet arthropathy.



There is minimal encroachment upon the central canal at the

T11-T12 level due to facet arthropathy.



Thoracic spine shows no significant central canal narrowing.

There is at least moderate bilateral T10-T11 neural foramen

narrowing from facet arthropathy. There is mild-to-moderate

bilateral T9-T10 neural foramen narrowing.



Thoracic spinal cord shows no abnormal signal and normal cord

caliber.



IMPRESSION:

1: There is concern for a subacute fracture involving the medial

aspect of the right T9 rib.



2: There is no other acute fracture or dislocation seen.



3: There are mild chronic compression deformities involving the

T9 and T12 vertebra and chronic anterior wedge compression

deformity of the T1 vertebra.



4: There is multilevel thoracic spine degenerative disease with

no significant central canal narrowing. There is moderate

bilateral neural foramen narrowing at the T10-T11 level and

mild-to-moderate bilateral neural foramen narrowing at the T9-T10

level due to facet arthropathy.



Dictated by: 



  Dictated on workstation # [id number]

## 2021-12-16 ENCOUNTER — HOSPITAL ENCOUNTER (EMERGENCY)
Dept: HOSPITAL 75 - ER | Age: 86
Discharge: HOME | End: 2021-12-16
Payer: MEDICARE

## 2021-12-16 VITALS — DIASTOLIC BLOOD PRESSURE: 93 MMHG | SYSTOLIC BLOOD PRESSURE: 198 MMHG

## 2021-12-16 VITALS — WEIGHT: 106.92 LBS | BODY MASS INDEX: 17.81 KG/M2 | HEIGHT: 65 IN

## 2021-12-16 DIAGNOSIS — I10: ICD-10-CM

## 2021-12-16 DIAGNOSIS — S60.221A: ICD-10-CM

## 2021-12-16 DIAGNOSIS — F03.90: ICD-10-CM

## 2021-12-16 DIAGNOSIS — S09.90XA: ICD-10-CM
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
## 2021-12-16 DIAGNOSIS — S00.83XA: Primary | ICD-10-CM

## 2021-12-16 PROCEDURE — 70450 CT HEAD/BRAIN W/O DYE: CPT

## 2021-12-16 PROCEDURE — 72125 CT NECK SPINE W/O DYE: CPT

## 2021-12-16 NOTE — DIAGNOSTIC IMAGING REPORT
PROCEDURE: CT head and CT cervical spine without contrast.



TECHNIQUE: Multiple contiguous axial images were obtained through

the brain and cervical spine without the use of intravenous

contrast. Sagittal and coronal reformations through the cervical

spine were then performed. Auto Exposure Controls were utilized

during the CT exam to meet ALARA standards for radiation dose

reduction. 



INDICATION:  Fall. Head and neck pain. Scalp hematoma.



COMPARISON: 04/11/2021.



FINDINGS: 

CT head: No large acute territorial ischemia, mass, or

hemorrhage. No midline shift or mass effect. Decreased

attenuation is seen in the periventricular and subcortical white

matter. The ventricles and cortical sulci are prominent. The

basilar cisterns are patent and unremarkable. 



The calvarium is intact. Hematoma is seen overlying the forehead

left of midline. The visualized paranasal sinuses are clear.



CT cervical spine: No acute fracture or dislocation is seen in

the cervical spine. Chronic height loss is seen in the superior

endplate of T1. Generalized osteopenia is noted. No focal osseous

lesions. Vertebral body heights are well-maintained. The

craniocervical junction is well-maintained. Mild degenerative

changes are seen in the cervical spine with disc osteophyte

complexes and uncovertebral arthropathy. Soft tissues of the neck

are unremarkable.



IMPRESSION:  

1. No hemorrhage or focal intra-axial mass.  No CT evidence of

large acute territorial ischemia.  

2. No acute fracture or dislocation in the cervical spine. 

3. Scalp hematoma overlying the forehead left of midline. No

associated calvarial fracture.



 



Dictated by: 



  Dictated on workstation # [id number]

## 2021-12-16 NOTE — ED FALL/INJURY
General


Chief Complaint:  Trauma-Non Activation


Stated Complaint:  FALL/HEAD INJURY


Source:  patient, caregiver


Exam Limitations:  other (baseline dementia)


 (BRANDIE JUNG STUDENT)





History of Present Illness


Date Seen by Provider:  Dec 16, 2021


Time Seen by Provider:  18:15


Initial Comments





This is an 87 YO female presenting to the ED with caregiver s/p unwitnessed fall

just PTA. History somewhat limited by pt's baseline dementia, but she says that 

she tripped over her dog's bed and fell forward, hitting her head. She notes a 

bump on her forehead, but denies any other injury or loss of consciousness. Pt 

was able to ambulate immediately afterwards. Caregiver does not think pt is 

taking a blood thinner and prior records do not list a blood thinner in pt's 

medication list.


Occurred:  just prior to arrival


Injuries/Pain Location:  head


Loss of Consciousness:  no loss of consciousness


Associated Symptoms (Fall):  No Abdominal Pain, No Chest Pain (BRANDIE JUNG)





Allergies and Home Medications


Allergies


Coded Allergies:  


     quetiapine (Verified  Adverse Reaction, Unknown, 19)


   confusion





Patient Home Medication List


Home Medication List Reviewed:  Yes


 (BRANDIE JUNG)


Home Medication List Reviewed:  Yes


 (PABLO CEE MD)


Amlodipine Besylate (Amlodipine Besylate) 5 Mg Tablet, 5 MG PO DAILY


   Prescribed by: NARENDRA ROSAS on 21 1506


Citalopram Hydrobromide (Citalopram HBr) 10 Mg Tablet, (Reported)


   Entered as Reported by: CLARICE GALVEZ on 21 1309


Polymyxin B Sulf/Trimethoprim (Polymyxin B-Tmp Eye Drops) 10 Ml Drops, 

(Reported)


   Entered as Reported by: CLARICE GALVEZ on 21 1309





Review of Systems


Review of Systems


Constitutional:  No chills, No fever


Eyes:  Denies Blurred Vision, Denies Decreased Acuity; Other (pt has baseline 

horizontal nystagmus that daughter says over the phone is normal for her)


Ears, Nose, Mouth, Throat:  denies epistaxis, denies mouth pain


Respiratory:  No cough, No short of breath


Cardiovascular:  No chest pain, No palpitations


Gastrointestinal:  No abdominal pain, No nausea, No vomiting


Genitourinary:  no symptoms reported


Musculoskeletal:  No back pain, No neck pain


Skin:  no symptoms reported


Psychiatric/Neurological:  No Symptoms Reported; Denies Headache, Denies 

Numbness (BRANDIE JNUG MED STUDENT)





All Other Systems Reviewed


Negative Unless Noted:  Yes (Negative excepted noted.)


 (BRANDIE JUNG MED STUDENT)





Past Medical-Social-Family Hx


Immunizations Up To Date


Tetanus Booster (TDap):  Unknown


PED Vaccines UTD:  No


 (BRANDIE JUNG STUDENT)





Seasonal Allergies


Seasonal Allergies:  No


 (BRANDIE JUNG STUDENT)





Past Medical History


Surgeries:  No


Respiratory:  No


Currently Using CPAP:  No


Currently Using BIPAP:  No


Cardiac:  Yes (SEES DR. BUSBY UNSURE WHY)


Hypertension


Neurological:  Yes


Dementia


Reproductive Disorders:  No


Female Reproductive Disorders:  Denies


Sexually Transmitted Disease:  No


HIV/AIDS:  No


Genitourinary:  Yes


UTI-Chronic


Gastrointestinal:  No


Musculoskeletal:  Yes


Fractures, Gout


Endocrine:  Yes


Hyperthyroidism


HEENT:  Yes


Cataract


Loss of Vision:  Denies


Hearing Impairment:  Hard of Hearing


Cancer:  No


Psychosocial:  No


Integumentary:  No


Recent Skin Changes


Blood Disorders:  No


Adverse Reaction/Blood Tranf:  No


 (BRANDIE JUNG STUDENT)





Family Medical History





Dementia


  G8 SISTER


Diabetes mellitus


  19 FATHER


No Pertinent Family Hx, Diabetes, Psychiatric Problems


 (BRANDIE JUNG STUDENT)





Physical Exam


Vital Signs





Vital Signs - First Documented








 21





 18:10 19:32


 


Temp 36.5 


 


Pulse 83 


 


Resp 20 


 


B/P (MAP) 173/75 (107) 


 


Pulse Ox 98 


 


O2 Delivery  Room Air





 (PABLO CEE MD)


Vital Signs


Capillary Refill :  


 (BRANDIE JUNG MED STUDENT)


Height, Weight, BMI


Height: 5'4.00"


Weight: 117lbs. 8.0oz. 53.182970bs; 32.00 BMI


Method:Stated


General Appearance:  WD/WN, no apparent distress


HEENT:  PERRL/EOMI, normal ENT inspection, TMs normal, pharynx normal; No 

scleral icterus (R), No scleral icterus (L); other (horizontal nystagmus with H 

test; hematoma to left medial forehead with overlying abrasion and dried blood, 

but no active bleeding; negative raccoon eyes and Ceja's sign; no hemotympanum

or nasoseptal hematoma)


Neck:  non-tender, supple, normal inspection


Cardiovascular:  regular rate, rhythm, no edema, no murmur


Respiratory:  chest non-tender, lungs clear, normal breath sounds, no 

respiratory distress, no accessory muscle use


Peripheral Pulses:  2+ Radial Pulses (R), 2+ Radial Pulses (L)


Gastrointestinal:  non tender, soft; No distended, No guarding, No rebound; 

other (no ecchymosis, abrasions, or lacerations)


Back:  no CVA tenderness, no vertebral tenderness


Extremities:  non-tender, normal inspection, no pedal edema, no calf tenderness,

normal capillary refill, pelvis stable


Neurologic/Psychiatric:  no motor/sensory deficits, alert, normal mood/affect, 

other (baseline dementia per caregiver; know month, but not day of the week or 

president)


Skin:  normal color, warm/dry (BRANDIE JUNG MED STUDENT)





Progress/Results/Core Measures


Results/Orders


My Orders





Orders - PABLO CEE MD


Ct Head/Cervical Spine Wo (21 18:29)


 (PABLO CEE MD)


Vital Signs/I&O











 21





 18:10 19:32


 


Temp 36.5 36.5


 


Pulse 83 83


 


Resp 20 20


 


B/P (MAP) 173/75 (107) 173/75 (107)


 


Pulse Ox 98 98


 


O2 Delivery  Room Air





 (PABLO CEE MD)





Progress


Progress Note :  


   Time:  19:53


Progress Note


86-year-old female presents to the emergency department today with a chief 

complaint of head injury.  Caregiver reports the patient had a mechanical trip 

and fall over her dog bed.  No loss of consciousness is reported.  She suffered 

a large hematoma to the left frontal scalp.  She denies any complaints of pain 

or injury anywhere else.  She reportedly is not on a blood thinner.  Denies pain

in her upper extremities, chest abdomen pelvis, lower extremities.  No recent 

illness.





Physical exam is remarkable for a large left frontal forehead hematoma with 

abrasion centrally.  She is neurologically intact.  She does have a history of 

dementia therefore history is not super reliable from the patient herself.  She 

also has a small hematoma noted to the ring finger on her right hand at the 

proximal phalanx and overlying the MCP.  Good range of motion of all the fingers

that hand is also neurovascularly intact.  She has pre-existing nystagmus worse 

to the right with both eyes.  She also has a a "lazy" eye on the right with 

medial gaze preference, this too seems to be pre-existing.  Patient denies any 

double vision today that is new.





CT head and cervical spine without contrast are unremarkable for any acute 

intracranial abnormality or cervical spine fracture.  Patient remains awake 

alert oriented with no specific concerns.  Wound is cleansed to the forehead a l

ittle Neosporin and a bandage is placed.  Recommend close observation for change

in neurologic status.  Return precautions given.  Caregiver at the bedside 

verbalized understanding, all questions are sought and answered.


 (PABLO CEE MD)





Diagnostic Imaging





   Diagonstic Imaging:  CT


Comments


                 [facility].


                                [place], [place]





NAME:   DARRION GARCIA


[facility] REC#:   [id number]


ACCOUNT#:   [id number]


PT STATUS:   REG ER


:   1935


PHYSICIAN:   PABLO CEE MD


ADMIT DATE:   21/ER


                                  ***Signed***


Date of Exam:21





CT HEAD/CERVICAL SPINE WO








PROCEDURE: CT head and CT cervical spine without contrast.





TECHNIQUE: Multiple contiguous axial images were obtained through


the brain and cervical spine without the use of intravenous


contrast. Sagittal and coronal reformations through the cervical


spine were then performed. Auto Exposure Controls were utilized


during the CT exam to meet ALARA standards for radiation dose


reduction. 





INDICATION:  Fall. Head and neck pain. Scalp hematoma.





COMPARISON: 2021.





FINDINGS: 


CT head: No large acute territorial ischemia, mass, or


hemorrhage. No midline shift or mass effect. Decreased


attenuation is seen in the periventricular and subcortical white


matter. The ventricles and cortical sulci are prominent. The


basilar cisterns are patent and unremarkable. 





The calvarium is intact. Hematoma is seen overlying the forehead


left of midline. The visualized paranasal sinuses are clear.





CT cervical spine: No acute fracture or dislocation is seen in


the cervical spine. Chronic height loss is seen in the superior


endplate of T1. Generalized osteopenia is noted. No focal osseous


lesions. Vertebral body heights are well-maintained. The


craniocervical junction is well-maintained. Mild degenerative


changes are seen in the cervical spine with disc osteophyte


complexes and uncovertebral arthropathy. Soft tissues of the neck


are unremarkable.





IMPRESSION:  


1. No hemorrhage or focal intra-axial mass.  No CT evidence of


large acute territorial ischemia.  


2. No acute fracture or dislocation in the cervical spine. 


3. Scalp hematoma overlying the forehead left of midline. No


associated calvarial fracture.





 





Dictated by: 





  Dictated on workstation # [id number]








Dict:   21


Trans:   21


CVB [id number]





Interpreted by:     ELENI BRISENO DO


Electronically signed by: ELENI BRISENO DO 21


 (PABLO CEE MD)





Departure


Impression





   Primary Impression:  


   Closed head injury


   Qualified Codes:  S09.90XA - Unspecified injury of head, initial encounter


   Additional Impressions:  


   Forehead contusion


   Qualified Codes:  S00.83XA - Contusion of other part of head, initial 

   encounter


   Contusion of right hand


   Qualified Codes:  S60.221A - Contusion of right hand, initial encounter


Disposition:  01 HOME, SELF-CARE


Condition:  Stable





Departure-Patient Inst.


Decision time for Depature:  19:57


 (PABLO CEE MD)


Referrals:  


NICOLASA MACHADO DO (PCP/Family)


Primary Care Physician


Patient Instructions:  Concussion, Adult (DC), Contusion (DC)





Add. Discharge Instructions:  


Ice pack to the right hand and forehead for swelling and bruising.





Monitor her for symptoms of change in mental status to include worsening 

confusion, severe headache and vomiting.  Please bring her back to the emergency

department if any of these things occur.





Follow-up with your primary care physician.


Verification and Attestation of Medical Student E/M Service





A medical student performed and documented this service in my presence. I 

reviewed and verified all information documented by the medical student and made

modifications to such information, when appropriate. I personally performed the 

physical exam and medical decision making. 





 Pablo Cee, Dec 16, 2021,19:59


  


 (PABLO CEE MD)











BRANDIE JUNG MED STUDENT     Dec 16, 2021 18:33


PABLO CEE MD         Dec 16, 2021 19:44

## 2022-03-12 ENCOUNTER — HOSPITAL ENCOUNTER (EMERGENCY)
Dept: HOSPITAL 75 - ER | Age: 87
Discharge: HOME | End: 2022-03-12
Payer: MEDICARE

## 2022-03-12 VITALS — SYSTOLIC BLOOD PRESSURE: 167 MMHG | DIASTOLIC BLOOD PRESSURE: 84 MMHG

## 2022-03-12 VITALS — HEIGHT: 63.98 IN | WEIGHT: 109.79 LBS | BODY MASS INDEX: 18.74 KG/M2

## 2022-03-12 DIAGNOSIS — R25.1: ICD-10-CM

## 2022-03-12 DIAGNOSIS — B02.9: ICD-10-CM

## 2022-03-12 DIAGNOSIS — S00.03XA: Primary | ICD-10-CM

## 2022-03-12 DIAGNOSIS — R53.1: ICD-10-CM
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
## 2022-03-12 DIAGNOSIS — R40.2410: ICD-10-CM

## 2022-03-12 LAB
ALBUMIN SERPL-MCNC: 3.7 GM/DL (ref 3.2–4.5)
ALP SERPL-CCNC: 51 U/L (ref 40–136)
ALT SERPL-CCNC: 24 U/L (ref 0–55)
APTT PPP: YELLOW S
BACTERIA #/AREA URNS HPF: NEGATIVE /HPF
BASOPHILS # BLD AUTO: 0 10^3/UL (ref 0–0.1)
BASOPHILS NFR BLD AUTO: 0 % (ref 0–10)
BILIRUB SERPL-MCNC: 0.7 MG/DL (ref 0.1–1)
BILIRUB UR QL STRIP: NEGATIVE
BUN/CREAT SERPL: 26
CALCIUM SERPL-MCNC: 9.6 MG/DL (ref 8.5–10.1)
CHLORIDE SERPL-SCNC: 104 MMOL/L (ref 98–107)
CO2 SERPL-SCNC: 22 MMOL/L (ref 21–32)
CREAT SERPL-MCNC: 1.04 MG/DL (ref 0.6–1.3)
EOSINOPHIL # BLD AUTO: 0 10^3/UL (ref 0–0.3)
EOSINOPHIL NFR BLD AUTO: 0 % (ref 0–10)
FIBRINOGEN PPP-MCNC: (no result) MG/DL
GFR SERPLBLD BASED ON 1.73 SQ M-ARVRAT: 52 ML/MIN
GLUCOSE SERPL-MCNC: 94 MG/DL (ref 70–105)
GLUCOSE UR STRIP-MCNC: NEGATIVE MG/DL
HCT VFR BLD CALC: 37 % (ref 35–52)
HGB BLD-MCNC: 12.2 G/DL (ref 11.5–16)
KETONES UR QL STRIP: NEGATIVE
LEUKOCYTE ESTERASE UR QL STRIP: NEGATIVE
LYMPHOCYTES # BLD AUTO: 3.1 10^3/UL (ref 1–4)
LYMPHOCYTES NFR BLD AUTO: 40 % (ref 12–44)
MAGNESIUM SERPL-MCNC: 1.5 MG/DL (ref 1.6–2.4)
MANUAL DIFFERENTIAL PERFORMED BLD QL: NO
MCH RBC QN AUTO: 30 PG (ref 25–34)
MCHC RBC AUTO-ENTMCNC: 33 G/DL (ref 32–36)
MCV RBC AUTO: 89 FL (ref 80–99)
MONOCYTES # BLD AUTO: 1 10^3/UL (ref 0–1)
MONOCYTES NFR BLD AUTO: 12 % (ref 0–12)
NEUTROPHILS # BLD AUTO: 3.6 10^3/UL (ref 1.8–7.8)
NEUTROPHILS NFR BLD AUTO: 47 % (ref 42–75)
NITRITE UR QL STRIP: NEGATIVE
PH UR STRIP: 7 [PH] (ref 5–9)
PLATELET # BLD: 125 10^3/UL (ref 130–400)
PMV BLD AUTO: 9.5 FL (ref 9–12.2)
POTASSIUM SERPL-SCNC: 4 MMOL/L (ref 3.6–5)
PROT SERPL-MCNC: 6.8 GM/DL (ref 6.4–8.2)
PROT UR QL STRIP: NEGATIVE
RBC #/AREA URNS HPF: (no result) /HPF
SODIUM SERPL-SCNC: 138 MMOL/L (ref 135–145)
SP GR UR STRIP: 1.01 (ref 1.02–1.02)
SQUAMOUS #/AREA URNS HPF: (no result) /HPF
WBC # BLD AUTO: 7.7 10^3/UL (ref 4.3–11)
WBC #/AREA URNS HPF: (no result) /HPF

## 2022-03-12 PROCEDURE — 70450 CT HEAD/BRAIN W/O DYE: CPT

## 2022-03-12 PROCEDURE — 72125 CT NECK SPINE W/O DYE: CPT

## 2022-03-12 PROCEDURE — 83735 ASSAY OF MAGNESIUM: CPT

## 2022-03-12 PROCEDURE — 36415 COLL VENOUS BLD VENIPUNCTURE: CPT

## 2022-03-12 PROCEDURE — 80053 COMPREHEN METABOLIC PANEL: CPT

## 2022-03-12 PROCEDURE — 85025 COMPLETE CBC W/AUTO DIFF WBC: CPT

## 2022-03-12 PROCEDURE — 81000 URINALYSIS NONAUTO W/SCOPE: CPT

## 2022-03-12 NOTE — ED FALL/INJURY
General


Chief Complaint:  Trauma-Non Activation


Stated Complaint:  FELL


Nursing Triage Note:  


PT TO RM 7 BY WHEELCHAIR WITH COMPLAINT OF MULTIPLE FALLS. STATES SHE FELL LAST 


NIGHT AND HIT HEAD. WAS STARTED ON MEDICINE FOR SHINGLES 3 DAYS AGO.


Source:  patient, family, old records


Exam Limitations:  no limitations





History of Present Illness


Date Seen by Provider:  Mar 12, 2022


Time Seen by Provider:  10:31


Initial Comments


This 87-year-old woman presents to the emergency room by private vehicle 

accompanied by family with concerns about a fall last night in which she struck 

her head on the living room floor (hard surface).  She had no loss of 

consciousness.  She does have a contusion or hematoma on the right occiput.  She

also has shingles encompassing the left neck, shoulder, and chest for which she 

has been on treatment for 3 or 4 days.  She denies any pain at this time.  She 

does feel a bit weak and has not been up to walk yet today.  She is alert and 

conversational but a little bit of a confused historian.  She was able to 

transition from wheelchair to bed with a little bit of assistance.





Allergies and Home Medications


Allergies


Coded Allergies:  


     quetiapine (Verified  Adverse Reaction, Unknown, 19)


   confusion





Patient Home Medication List


Home Medication List Reviewed:  Yes


Amlodipine Besylate (Amlodipine Besylate) 5 Mg Tablet, 5 MG PO DAILY


   Prescribed by: NARENDRA ROSAS on 21 1506


Citalopram Hydrobromide (Citalopram HBr) 10 Mg Tablet, (Reported)


   Entered as Reported by: CLRAICE GALVEZ on 21 1309


Polymyxin B Sulf/Trimethoprim (Polymyxin B-Tmp Eye Drops) 10 Ml Drops, 

(Reported)


   Entered as Reported by: CLARICE GALVEZ on 21 1309





Review of Systems


Review of Systems


Constitutional:  no symptoms reported


Eyes:  No Symptoms Reported


Ears, Nose, Mouth, Throat:  no symptoms reported


Respiratory:  no symptoms reported


Cardiovascular:  no symptoms reported


Gastrointestinal:  no symptoms reported


Genitourinary:  no symptoms reported


Pregnant:  No


Musculoskeletal:  see HPI


Skin:  see HPI


Psychiatric/Neurological:  See HPI





Past Medical-Social-Family Hx


Patient Social History


Tobacco Use?:  No


Use of E-Cig and/or Vaping dev:  No


Substance use?:  No


Alcohol Use?:  No


Pt feels they are or have been:  No





Immunizations Up To Date


Tetanus Booster (TDap):  Unknown


PED Vaccines UTD:  No


First/Initial COVID19 Vaccinat:  YES


Second COVID19 Vaccination Brayden:  YES


Third COVID19 Vaccination Date:  YES





Seasonal Allergies


Seasonal Allergies:  No





Past Medical History


Surgeries:  No


Respiratory:  No


Currently Using CPAP:  No


Currently Using BIPAP:  No


Cardiac:  Yes (SEES DR. BUSBY UNSURE WHY)


Hypertension


Neurological:  Yes


Dementia


Reproductive Disorders:  No


Female Reproductive Disorders:  Denies


Sexually Transmitted Disease:  No


HIV/AIDS:  No


Genitourinary:  Yes


UTI-Chronic


Gastrointestinal:  No


Musculoskeletal:  Yes


Fractures, Gout


Endocrine:  Yes


Hyperthyroidism


HEENT:  Yes


Cataract


Loss of Vision:  Denies


Hearing Impairment:  Hard of Hearing


Cancer:  No


Psychosocial:  No


Integumentary:  No


Recent Skin Changes


Blood Disorders:  No


Adverse Reaction/Blood Tranf:  No





Family Medical History





Dementia


  G8 SISTER


Diabetes mellitus


  19 FATHER


No Pertinent Family Hx, Diabetes, Psychiatric Problems





Physical Exam


Vital Signs





Vital Signs - First Documented








 3/12/22





 10:28


 


Temp 36.4


 


Pulse 84


 


Resp 16


 


B/P (MAP) 177/87 (117)


 


Pulse Ox 96


 


O2 Delivery Room Air





Capillary Refill : Less Than 3 Seconds


Height, Weight, BMI


Height: 5'4.00"


Weight: 117lbs. 8.0oz. 53.333831dq; 18.00 BMI


Method:Stated


General Appearance:  WD/WN, no apparent distress, thin


HEENT:  PERRL/EOMI, normal ENT inspection, other (Mucous membranes moist)


Neck:  non-tender, normal inspection


Cardiovascular:  regular rate, rhythm, no edema, no murmur


Respiratory:  lungs clear, normal breath sounds, no respiratory distress


Gastrointestinal:  normal bowel sounds, non tender, soft


Extremities:  non-tender, normal inspection, no pedal edema, other (No pain with

range of motion, rotation of the hips, or palpation of the hips)


Neurologic/Psychiatric:  CNs II-XII nml as tested, no motor/sensory deficits, 

alert, normal mood/affect, other (Confused historian)


Skin:  normal color, warm/dry, ecchymosis, other (Swelling and contusion on the 

right posterior occipitoparietal scalp)





Leticia Coma Score


Best Eye Response:  (4) Open Spontaneously


Best Verbal Response:  (5) Oriented


Best Motor Response:  (6) Obeys Commands


Leticia Total:  15





Progress/Results/Core Measures


Results/Orders


Lab Results





Laboratory Tests








Test


 3/12/22


10:46 3/12/22


12:12 Range/Units


 


 


White Blood Count


 7.7 


 


 4.3-11.0


10^3/uL


 


Red Blood Count


 4.14 


 


 3.80-5.11


10^6/uL


 


Hemoglobin 12.2   11.5-16.0  g/dL


 


Hematocrit 37   35-52  %


 


Mean Corpuscular Volume 89   80-99  fL


 


Mean Corpuscular Hemoglobin 30   25-34  pg


 


Mean Corpuscular Hemoglobin


Concent 33 


 


 32-36  g/dL





 


Red Cell Distribution Width 14.0   10.0-14.5  %


 


Platelet Count


 125 L


 


 130-400


10^3/uL


 


Mean Platelet Volume 9.5   9.0-12.2  fL


 


Immature Granulocyte % (Auto) 0    %


 


Neutrophils (%) (Auto) 47   42-75  %


 


Lymphocytes (%) (Auto) 40   12-44  %


 


Monocytes (%) (Auto) 12   0-12  %


 


Eosinophils (%) (Auto) 0   0-10  %


 


Basophils (%) (Auto) 0   0-10  %


 


Neutrophils # (Auto)


 3.6 


 


 1.8-7.8


10^3/uL


 


Lymphocytes # (Auto)


 3.1 


 


 1.0-4.0


10^3/uL


 


Monocytes # (Auto)


 1.0 


 


 0.0-1.0


10^3/uL


 


Eosinophils # (Auto)


 0.0 


 


 0.0-0.3


10^3/uL


 


Basophils # (Auto)


 0.0 


 


 0.0-0.1


10^3/uL


 


Immature Granulocyte # (Auto)


 0.0 


 


 0.0-0.1


10^3/uL


 


Sodium Level 138   135-145  MMOL/L


 


Potassium Level 4.0   3.6-5.0  MMOL/L


 


Chloride Level 104     MMOL/L


 


Carbon Dioxide Level 22   21-32  MMOL/L


 


Anion Gap 12   5-14  MMOL/L


 


Blood Urea Nitrogen 27 H  7-18  MG/DL


 


Creatinine


 1.04 


 


 0.60-1.30


MG/DL


 


Estimat Glomerular Filtration


Rate 52 


 


  





 


BUN/Creatinine Ratio 26    


 


Glucose Level 94     MG/DL


 


Calcium Level 9.6   8.5-10.1  MG/DL


 


Corrected Calcium 9.8   8.5-10.1  MG/DL


 


Magnesium Level 1.5 L  1.6-2.4  MG/DL


 


Total Bilirubin 0.7   0.1-1.0  MG/DL


 


Aspartate Amino Transf


(AST/SGOT) 34 


 


 5-34  U/L





 


Alanine Aminotransferase


(ALT/SGPT) 24 


 


 0-55  U/L





 


Alkaline Phosphatase 51     U/L


 


Total Protein 6.8   6.4-8.2  GM/DL


 


Albumin 3.7   3.2-4.5  GM/DL


 


Urine Color  YELLOW   


 


Urine Clarity  SL CLOUDY   


 


Urine pH  7.0  5-9  


 


Urine Specific Gravity  1.015 L 1.016-1.022  


 


Urine Protein  NEGATIVE  NEGATIVE  


 


Urine Glucose (UA)  NEGATIVE  NEGATIVE  


 


Urine Ketones  NEGATIVE  NEGATIVE  


 


Urine Nitrite  NEGATIVE  NEGATIVE  


 


Urine Bilirubin  NEGATIVE  NEGATIVE  


 


Urine Urobilinogen  0.2  < = 1.0  MG/DL


 


Urine Leukocyte Esterase  NEGATIVE  NEGATIVE  


 


Urine RBC (Auto)  TRACE-I H NEGATIVE  


 


Urine RBC  RARE   /HPF


 


Urine WBC  0-2   /HPF


 


Urine Squamous Epithelial


Cells 


 NONE 


  /HPF





 


Urine Crystals  NONE   /LPF


 


Urine Bacteria  NEGATIVE   /HPF


 


Urine Casts  NONE   /LPF


 


Urine Mucus  NEGATIVE   /LPF


 


Urine Culture Indicated  NO   








My Orders





Orders - GRICEL HILL MD


Ct Head/Cervical Spine Wo (3/12/22 10:38)


Cbc With Automated Diff (3/12/22 10:38)


Comprehensive Metabolic Panel (3/12/22 10:38)


Magnesium (3/12/22 10:38)


Ua Culture If Indicated (3/12/22 10:38)


Ed Iv/Invasive Line Start (3/12/22 10:38)





Vital Signs/I&O











 3/12/22





 10:28


 


Temp 36.4


 


Pulse 84


 


Resp 16


 


B/P (MAP) 177/87 (117)


 


Pulse Ox 96


 


O2 Delivery Room Air














Blood Pressure Mean:                    117











Progress


Progress Note #1:  


   Time:  11:00


Progress Note


Patient seen and examined.  Labs and CT pending.  Disposition will be determined

after labs and CT are reviewed.


Progress Note #2:  


   Time:  13:05


Progress Note


Work-up was relatively unremarkable.  CT demonstrated no evidence of serious 

injury.  Patient demonstrated ability to ambulate with minimal assistance.  Both

patient and her son feel comfortable returning home at this point.  Her son is 

often at home with her and they have college students who are helpers in the 

home as well.  She was encouraged to use her walker or assistance from other 

individuals at all times.  She currently has medication for her shingles which 

she was encouraged to complete.  Her son inquired about treatment for tremors.  

I prefer that she discuss medication options with her primary care provider 

during a visit and a medication review.  I did recommend physical therapy for 

strength and balance training.  They were given the contact information for the 

therapy department and a flyer.





Diagnostic Imaging





   Diagonstic Imaging:  CT


   Plain Films/CT/US/NM/MRI:  c-spine, head


Comments


CT head and cervical spine viewed by me and report reviewed.  See report below:





NAME:   DARRION GARCIA  


[facility] REC#:   [id number]  


ACCOUNT#:   [id number]  


PT STATUS:   REG ER  


:   1935  


PHYSICIAN:   GRICEL HILL MD  


ADMIT DATE:   22/ER  


                                                                      

***Draft***  


Date of Exam:22  





CT HEAD/CERVICAL SPINE WO  





PROCEDURE: CT head and CT cervical spine without contrast.  





 TECHNIQUE: Multiple contiguous axial images were obtained through  


 the brain and cervical spine without the use of intravenous  


 contrast. Sagittal and coronal reformations through the cervical  


 spine were then performed. Auto Exposure Controls were utilized  


 during the CT exam to meet ALARA standards for radiation dose  


 reduction.   





 INDICATION: Fall.  





 CORRELATION is made with prior CT from 2021.  





 CT HEAD:  





 The ventricles and sulci are consistent with the patient's age.  


 There is periventricular low-attenuation consistent with chronic  


 microvascular ischemia. 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  


 ganglia. No sulcal effacement or midline shift is identified. No  


 acute intra-axial or extra-axial hemorrhage is detected. Cisterns  


 are patent. The visualized paranasal sinuses are clear.  





 IMPRESSION:   


 1. Stable chronic changes. No acute intracranial process is  


 detected.  





 CT CERVICAL SPINE:  





 Curvature of the cervical spine is normal. There is minimal  


 anterolisthesis of C3 on C4 and C4 on C5 with minimal  


 retrolisthesis of C5 on C6. There is multilevel degenerative disc  


 disease with variable disc space narrowing and marginal spurring.  


 No fractures are seen. The prevertebral tissues are within normal  


 limits. Odontoid appears intact.  





 IMPRESSION:   


 1. Cervical spondylosis and listheses. No acute bony abnormality  


 is detected.  





   Dictated on workstation # [id number]  





Dict:   22 [id number]  


Trans:   22 1146  


[facility] [id number]  





Interpreted by:     POLA BERRY MD





Departure


Impression





   Primary Impression:  


   Fall on same level


   Qualified Codes:  W18.30XA - Fall on same level, unspecified, initial 

   encounter


   Additional Impressions:  


   Generalized weakness


   Scalp contusion


   Qualified Codes:  S00.03XA - Contusion of scalp, initial encounter


   Shingles rash


   Qualified Codes:  B02.9 - Zoster without complications


   Tremor


Disposition:  01 HOME, SELF-CARE


Condition:  Stable





Departure-Patient Inst.


Referrals:  


NICOLASA MACHADO DO (PCP/Family)


Primary Care Physician


Patient Instructions:  Preventing Falls in Older Adults, Shingles





Add. Discharge Instructions:  


Always walk with your walker, preferably with assistance.  


Drink plenty of clear liquids to stay well-hydrated.


Follow-up with your primary care provider soon as possible.  Discuss strategy 

for improving strength and balance, possibly through physical therapy.  You can 

call the [facility] at [phone] to schedule an 

evaluation.


Try to eat a well-balanced diet with nutritional supplements such as Ensure.


Finish your medicine for your shingles rash.


Call with questions or concerns.


Return to the ER if you have worsening symptoms.





All discharge instructions reviewed with patient and/or family. Voiced 

understanding.





Copy


Copies To 1:   NICOLASA MACHADO JOSHUA T MD        Mar 12, 2022 10:57

## 2022-03-12 NOTE — DIAGNOSTIC IMAGING REPORT
PROCEDURE: CT head and CT cervical spine without contrast.



TECHNIQUE: Multiple contiguous axial images were obtained through

the brain and cervical spine without the use of intravenous

contrast. Sagittal and coronal reformations through the cervical

spine were then performed. Auto Exposure Controls were utilized

during the CT exam to meet ALARA standards for radiation dose

reduction. 



INDICATION: Fall.



CORRELATION is made with prior CT from 12/16/2021.



CT HEAD:



The ventricles and sulci are consistent with the patient's age.

There is periventricular low-attenuation consistent with chronic

microvascular ischemia. 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

ganglia. No sulcal effacement or midline shift is identified. No

acute intra-axial or extra-axial hemorrhage is detected. Cisterns

are patent. The visualized paranasal sinuses are clear.



IMPRESSION: 

1. Stable chronic changes. No acute intracranial process is

detected.





CT CERVICAL SPINE:



Curvature of the cervical spine is normal. There is minimal

anterolisthesis of C3 on C4 and C4 on C5 with minimal

retrolisthesis of C5 on C6. There is multilevel degenerative disc

disease with variable disc space narrowing and marginal spurring.

No fractures are seen. The prevertebral tissues are within normal

limits. Odontoid appears intact.



IMPRESSION: 

1. Cervical spondylosis and listheses. No acute bony abnormality

is detected.



 



Dictated by: 



  Dictated on workstation # [id number]

## 2022-05-03 NOTE — ED TRAUMA-MULTISYSTEM
General


Stated Complaint:  FALL, L SHOULDER FX


Source of Information:  EMS


Exam Limitations:  Physical Impairments





History of Present Illness


Date Seen by Provider:  May 3, 2022


Time Seen by Provider:  08:55


Initial Comments


Patient is an 87-year-old female who presents to the emergency department by 

ambulance today after being found by her son in the bathroom.  Patient had an 

unwitnessed fall in the bathroom.  Unknown loss of consciousness.  Past medical 

history includes dementia as well as hypertension.  Per review of the medical 

record she is not on any blood thinners.  No recent illnesses are known.  Her 

son is deaf and uses his phone to assist.  He states that he thought she had 

been in the bathroom too long, went in to find her and she had fallen.  EMS 

reported left-sided temporal injury with hematoma.  No vomiting.  Also obvious 

deformity of the left shoulder.  Patient is normally ambulatory with a walker at

home.  Daughter is DPOA and lives in [facility] [phone].  

At the time of arrival Kely is a full code.





Review of systems unobtainable secondary to her head injured state/dementia


Occurred:  This Morning


Pain/Injury Location:  Face, Head, Upper Extremity (left shoulder)


Loss of Consciousness:  Unsure





Allergies and Home Medications


Allergies


Coded Allergies:  


     quetiapine (Verified  Adverse Reaction, Unknown, 19)


   confusion





Patient Home Medication List


Home Medication List Reviewed:  Yes


Amlodipine Besylate (Amlodipine Besylate) 5 Mg Tablet, 5 MG PO DAILY


   Prescribed by: NARENDRA ROSAS on 21 1506


Citalopram Hydrobromide (Citalopram HBr) 10 Mg Tablet, (Reported)


   Entered as Reported by: CLARICE GALVEZ on 21 1309


Polymyxin B Sulf/Trimethoprim (Polymyxin B-Tmp Eye Drops) 10 Ml Drops, 

(Reported)


   Entered as Reported by: CLARICE GALVEZ on 21 1309





Review of Systems


Review of Systems


Constitutional:  see HPI


Unable to assess patient's review of systems secondary to clinical status, 

nonverbal/dementia





Past Medical-Social-Family Hx


Immunizations Up To Date


Tetanus Booster (TDap):  Unknown


PED Vaccines UTD:  No


First/Initial COVID19 Vaccinat:  YES


Second COVID19 Vaccination Brayden:  YES


Third COVID19 Vaccination Date:  YES





Seasonal Allergies


Seasonal Allergies:  No





Past Medical History


Surgeries:  No


Respiratory:  No


Currently Using CPAP:  No


Currently Using BIPAP:  No


Cardiac:  Yes (SEES DR. BUSBY UNSURE WHY)


Hypertension


Neurological:  Yes


Dementia


Reproductive Disorders:  No


Female Reproductive Disorders:  Denies


Sexually Transmitted Disease:  No


HIV/AIDS:  No


Genitourinary:  Yes


UTI-Chronic


Gastrointestinal:  No


Musculoskeletal:  Yes


Fractures, Gout


Endocrine:  Yes


Hyperthyroidism


HEENT:  Yes


Cataract


Loss of Vision:  Denies


Hearing Impairment:  Hard of Hearing


Cancer:  No


Psychosocial:  No


Integumentary:  No


Recent Skin Changes


Blood Disorders:  No


Adverse Reaction/Blood Tranf:  No





Family Medical History





Dementia


  G8 SISTER


Diabetes mellitus


  19 FATHER


No Pertinent Family Hx, Diabetes, Psychiatric Problems





Physical Exam


Vital Signs





Vital Signs - First Documented








 5/3/22 5/3/22





 09:02 13:10


 


Temp 35.7 


 


Pulse 72 


 


Resp 21 


 


B/P (MAP) 216/101 (139) 


 


Pulse Ox 97 


 


O2 Delivery Room Air 


 


O2 Flow Rate  2.00








Height, Weight, BMI


Height: 5'4.00"


Weight: 117lbs. 8.0oz. [id number]; 18.00 BMI


Method:Stated


General Appearance:  Chronically ill, Thin


Head:  Contusions (left temple), Lacerations (left eyebrow; ecchymoses left 

temple), Other (TM on left dusky blue - unable to visualise right TM); No 

Ceja's Sign


Eyes:  Bilateral Eye Normal Inspection, Bilateral Eye PERRL (2-3mm)


Ears, Nose, Throat:  No Evidence of ENT Injury, No Dental Injury, Hemotympanum 

(? left (right obscured))


Neck:  Normal Inspection, Non Tender


Cardiovascular:  Regular Rate, Rhythm, Normal Peripheral Pulses


Respiratory:  Lungs Clear, Normal Breath Sounds, No Accessory Muscle Use, No Res

piratory Distress


Gastrointestinal:  Soft, Tenderness (mild diffuse - non distended. normal BS)


Genital/Rectal:  Normal Genital Exam


Extremity:  Normal Capillary Refill, No Pedal Edema, Swelling (left upper arm - 

with slight hematoma. resists ROM due to pain (and obvious deformity))


Neurologic/Psychiatric:  Alert, No Motor/Sensory Deficits, Depressed Affect, 

Disoriented ((normal - dementia))


Skin:  Warm/Dry, Other (1.5cm laceration to left lateral eyebrow; no active 

bleeding. surrounding hematoma; small puncture wound left lateral anterior 

shoulder.)





Leticia Coma Score


Best Eye Response (Leticia):  (4) Open Spontaneously


Best Verbal Response ([place]):  (4) Confused Conversation


Best Motor Response ([place]):  (6) Obeys Commands





Procedures/Interventions





   Wound Location:  Eye


Other Wound Location


left eye brow


   Wound Length (cm):  1.5


   Wound's Depth, Shape:  superficial, linear


   Wound Explored:  clean


   Irrigated w/ Saline (ccs):  50


   Betadine Prep?:  No


   Anesthesia:  1% Lidocaine


   Volume Anesthetic (ccs):  2


   Suture:  Ethlion


   Suture Size:  5-0


   Number of Sutures:  3


   Layer Closure?:  1


   Sterile Dressing Applied?:  No





Progress/Results/Core Measures


Results/Orders


Lab Results





Laboratory Tests








Test


 5/3/22


09:10 Range/Units


 


 


White Blood Count


 9.4 


 4.3-11.0


10^3/uL


 


Red Blood Count


 4.20 


 3.80-5.11


10^6/uL


 


Hemoglobin 12.4  11.5-16.0  g/dL


 


Hematocrit 38  35-52  %


 


Mean Corpuscular Volume 89  80-99  fL


 


Mean Corpuscular Hemoglobin 30  25-34  pg


 


Mean Corpuscular Hemoglobin


Concent 33 


 32-36  g/dL





 


Red Cell Distribution Width 14.2  10.0-14.5  %


 


Platelet Count


 187 


 130-400


10^3/uL


 


Mean Platelet Volume 9.3  9.0-12.2  fL


 


Immature Granulocyte % (Auto) 0   %


 


Neutrophils (%) (Auto) 54  42-75  %


 


Lymphocytes (%) (Auto) 37  12-44  %


 


Monocytes (%) (Auto) 7  0-12  %


 


Eosinophils (%) (Auto) 1  0-10  %


 


Basophils (%) (Auto) 0  0-10  %


 


Neutrophils # (Auto)


 5.1 


 1.8-7.8


10^3/uL


 


Lymphocytes # (Auto)


 3.5 


 1.0-4.0


10^3/uL


 


Monocytes # (Auto)


 0.7 


 0.0-1.0


10^3/uL


 


Eosinophils # (Auto)


 0.1 


 0.0-0.3


10^3/uL


 


Basophils # (Auto)


 0.0 


 0.0-0.1


10^3/uL


 


Immature Granulocyte # (Auto)


 0.0 


 0.0-0.1


10^3/uL


 


Prothrombin Time 13.3  12.2-14.7  SEC


 


INR Comment 1.0  0.8-1.4  


 


Activated Partial


Thromboplast Time 24 


 24-35  SEC





 


Urine Color YELLOW   


 


Urine Clarity CLEAR   


 


Urine pH 7.0  5-9  


 


Urine Specific Gravity 1.015 L 1.016-1.022  


 


Urine Protein NEGATIVE  NEGATIVE  


 


Urine Glucose (UA) NEGATIVE  NEGATIVE  


 


Urine Ketones NEGATIVE  NEGATIVE  


 


Urine Nitrite NEGATIVE  NEGATIVE  


 


Urine Bilirubin NEGATIVE  NEGATIVE  


 


Urine Urobilinogen 0.2  < = 1.0  MG/DL


 


Urine Leukocyte Esterase NEGATIVE  NEGATIVE  


 


Urine RBC (Auto) NEGATIVE  NEGATIVE  


 


Urine RBC NONE   /HPF


 


Urine WBC 0-2   /HPF


 


Urine Squamous Epithelial


Cells RARE 


  /HPF





 


Urine Crystals NONE   /LPF


 


Urine Bacteria TRACE   /HPF


 


Urine Casts NONE   /LPF


 


Urine Mucus NEGATIVE   /LPF


 


Urine Culture Indicated NO   


 


Sodium Level 140  135-145  MMOL/L


 


Potassium Level 3.9  3.6-5.0  MMOL/L


 


Chloride Level 102    MMOL/L


 


Carbon Dioxide Level 24  21-32  MMOL/L


 


Anion Gap 14  5-14  MMOL/L


 


Blood Urea Nitrogen 21 H 7-18  MG/DL


 


Creatinine


 0.91 


 0.60-1.30


MG/DL


 


Estimat Glomerular Filtration


Rate 61 


  





 


BUN/Creatinine Ratio 23   


 


Glucose Level 115 H   MG/DL


 


Calcium Level 9.6  8.5-10.1  MG/DL


 


Corrected Calcium 9.8  8.5-10.1  MG/DL


 


Total Bilirubin 0.7  0.1-1.0  MG/DL


 


Aspartate Amino Transf


(AST/SGOT) 29 


 5-34  U/L





 


Alanine Aminotransferase


(ALT/SGPT) 21 


 0-55  U/L





 


Alkaline Phosphatase 50    U/L


 


Total Protein 7.0  6.4-8.2  GM/DL


 


Albumin 3.7  3.2-4.5  GM/DL








My Orders





Orders - PABLO CEE MD


Ed Iv/Invasive Line Start (5/3/22 08:59)


Cbc With Automated Diff (5/3/22 08:59)


Comprehensive Metabolic Panel (5/3/22 08:59)


Catheter(Urinary) Insert & Ass 03,15 (5/3/22 08:59)


Ekg Tracing (5/3/22 08:59)


Ct Head/Cervical Spine Wo (5/3/22 08:59)


Chest 1 View, Ap/Pa Only (5/3/22 08:59)


Shoulder, Left, 2 Views (5/3/22 08:59)


Type And Screen (5/3/22 08:59)


Protime With Inr (5/3/22 08:59)


Partial Thromboplastin Time (5/3/22 08:59)


Ua Culture If Indicated (5/3/22 08:59)


Cefazolin 2 Gm Iv Premixed (Ancef 2 Gm P (5/3/22 10:00)


Fentanyl  Inj (Sublimaze Injection) (5/3/22 10:30)


Fentanyl  Inj (Sublimaze Injection) (5/3/22 10:21)


Ns (Ivpb) (Sodium C... W/Nicardipine Iv (5/3/22 11:15)


Fentanyl  Inj (Sublimaze Injection) (5/3/22 11:45)





Medications Given in ED





Vital Signs/I&O











 5/3/22 5/3/22 5/3/22 5/3/22





 09:02 11:40 12:22 13:10


 


Temp 35.7   36.0


 


Pulse 72   81


 


Resp 21   13


 


B/P (MAP) 216/101 (139) 196/107 142/76 156/97


 


Pulse Ox 97   97


 


O2 Delivery Room Air   Nasal Cannula


 


O2 Flow Rate    2.00











Progress


Progress Note #1:  


   Time:  11:19


Progress Note


Discussed with  at 1109.  Accepted by Dr. Willis Og.  Patient remains quite

hypertensive with a diastolic of 111.  We will start Cardene drip.  Images have 

been clouded to [facility].


Progress Note #2:  


   Time:  12:42


Progress Note


Awaiting Flight to .  Will open her eyes to command, will squeeze my hand. Not

verbal now - will only shake her head yes and no.  VSS.  systolic on 2.5 

of Cardene.  She has been given fentanyl for pain.  LUE held in position of 

comfort.  GCS was about 14 initially - now down to 11.  He eyes are half closed.

she will open them to command.  Not saying anything now. (I suspect would make 

sounds if I were to manipulate her left arm).





3 superficial interrupted sutures to left eyebrow with 5-0 ethilon.





Long discussion with Nikki on the phone regarding her mother's age, injuries 

and potential for complete recovery.  We talked about DNR status and Nikki is 

agreeable to DNR status.  She will also communicate this to two other nurses.


Initial ECG Impression Date:  May 3, 2022


Initial ECG Impression Time:  10:00


Initial ECG Rate:  69


Initial ECG Rhythm:  Normal Sinus


Initial ECG Intervals


NC interval 212


QRS 85


QTc 418





Nonspecific ST-T wave changes across the precordium, no overt ST segment 

elevation or depression is noted





Diagnostic Imaging





   Diagonstic Imaging:  CT


Comments


                 [facility]


                                [place], [place]





NAME:   KELY GARCIA REC#:   [id number]


ACCOUNT#:   [id number]


PT STATUS:   REG ER


:   1935


PHYSICIAN:   PABLO CEE MD


ADMIT DATE:   22/ER


                                   ***Draft***


Date of Exam:22





CT HEAD/CERVICAL SPINE WO








Indication: Trauma with head and neck pain.





TECHNIQUE: Multiple contiguous axial images were obtained through


the brain and cervical spine without the use of intravenous


contrast. Sagittal and coronal reformations through the cervical


spine were then performed. Auto Exposure Controls were utilized


during the CT exam to meet ALARA standards for radiation dose


reduction. 





Comparison made with 2022





There is an acute subdural hematoma over the right frontotemporal


convexity, with maximal diameter about 1 cm. There is localized


mass effect with mild midline shift. There is about 5 mm of


right-to-left midline shift. There are underlying atrophic


changes with chronic ischemic changes in the deep white matter.


There is no ventricular dilatation.





Calvarial windows show no definite fracture. There is high


density fluid compatible with blood in the sphenoid sinuses as


well as a small amount of air in the cavernous sinuses, raising


the possibility of basilar skull fracture.





CT cervical spine findings:





There is no evidence of cervical spine fracture. There is no


subluxation or malalignment. There is  diffuse facet degenerative


change. There is  diffuse disc space narrowing at all levels.





IMPRESSION:





CT brain demonstrates an acute subdural hematoma along the right


frontotemporal convexity, with maximal diameter at 1 cm. This is


about 5 mm of right-to-left midline shift. There is underlying


atrophy and chronic change in the deep white matter. There is


high density fluid compatible with blood in the sphenoid sinuses


as well as some blood in the left maxillary sinus. There is a


small amount of intracranial air in the region of the cavernous


sinuses. This does raise the possibility of an occult basilar


skull fracture.





CT cervical spine shows degenerative findings but no acute


fracture or subluxation.





  Dictated on workstation # [id number]








Dict:   22 0938


Trans:   22 0948


[facility] [id number]





Interpreted by:     SUNSHINE CALVILLO MD


Electronically signed by:








   Diagonstic Imaging:  Xray


Comments


                 [facility] [place], [place]





[facility]   [id number]


ACCOUNT#:   [id number]


PT STATUS:   REG ER


:   1935


PHYSICIAN:   PABLO CEE MD


ADMIT DATE:   22/ER


                                   ***Draft***


Date of Exam:22





SHOULDER, LEFT, 2 VIEWS








HISTORY: 


Trauma, left humerus injury.





TECHNIQUE: 


Two views of the left shoulder.





COMPARISON: 


None.





FINDINGS:


Bones appear osteopenic. There is an oblique mildly comminuted


fracture of the proximal left humerus diaphysis with lateral


displacement and marked medial angulation. There is about 2 cm of


overriding. The glenohumeral alignment appears normal.





IMPRESSION:


Displaced, angulated fracture of the proximal left humerus


diaphysis.





  Dictated on workstation # [id number]








Dict:   22 1005


Trans:   22 1008


 [id number]





Interpreted by:     CHRIS BLAIR MD


Electronically signed by:








   Michellenstic Imaging:  Xray


   Plain Films/CT/US/NM/MRI:  chest


Comments


                 [facility] [place], [place]





[facility]:   [facility] REC[facility]   [id number]


ACCOUNT#:   [id number]


PT STATUS:   REG ER


:   1935


PHYSICIAN:   PABLO CEE MD


ADMIT DATE:   22/ER


                                   ***Draft***


Date of Exam:22





CHEST 1 VIEW, AP/PA ONLY








HISTORY: 


Trauma, fall, chest injury.





TECHNIQUE: 


Frontal view of the chest.





COMPARISON: 


10/25/2020.





FINDINGS:


Lung volumes are normal. No consolidation is seen. There is no


pleural effusion or pneumothorax. The cardiac silhouette is


normal in size. Bones appear osteopenic. There are old right rib


deformities. No definite acute fracture is seen.





IMPRESSION:


1. No acute pulmonary abnormality.


2. Left humerus fracture is described separately.





  Dictated on workstation # [id number]








Dict:   22 1003


Trans:   22 1007


 [id number]





Interpreted by:     CHRIS BLAIR MD


Electronically signed by:





Critical Care Note


Critical Care


Start Time:  08:55


Stop Time:  12:00


Total Time (minutes)


1 hour critical care time in the evaluation and management of this 87-year-old 

with head injury and open fracture to the left shoulder.  Time includes initial 

evaluation and management, management of hypertension with Cardene drip, review 

and interpretation of labs, x-rays, CTs.  Discussion with radiologist, 

discussion with transferring hospital.  Review and interpretation of old 

records.  Multiple discussions with family members.





Departure


Impression





   Primary Impression:  


   Subdural hematoma


   Additional Impressions:  


   Open fracture of left humerus


   Qualified Codes:  S42.292B - Other displaced fracture of upper end of left 

   humerus, initial encounter for open fracture


   Hypertensive disorder


   Qualified Codes:  I10 - Essential (primary) hypertension


   Laceration of left eyebrow


   Qualified Codes:  S01.112A - Laceration without foreign body of left eyelid 

   and periocular area, initial encounter


Disposition:   [facility]F [facility]


Condition:  Critical





Transfer


Transfer Reason:  Exceeds level of care


Time Spoke to Accepting Phy:  11:09


Transfer Progress Notes


Discussed with  Transfer Center - accepted per Dr Willis Og


Transfer Facility:  


[facility]


Method of Transfer:  Air





Departure-Patient Inst.


Referrals:  


NICOLASA MACHADO DO (PCP/Family)


Primary Care Physician











PABLO CEE MD          May 3, 2022 09:03

## 2022-05-03 NOTE — DIAGNOSTIC IMAGING REPORT
Indication: Trauma with head and neck pain.



TECHNIQUE: Multiple contiguous axial images were obtained through

the brain and cervical spine without the use of intravenous

contrast. Sagittal and coronal reformations through the cervical

spine were then performed. Auto Exposure Controls were utilized

during the CT exam to meet ALARA standards for radiation dose

reduction. 



Comparison made with 03/12/2022



There is an acute subdural hematoma over the right frontotemporal

convexity, with maximal diameter about 1 cm. There is localized

mass effect with mild midline shift. There is about 5 mm of

right-to-left midline shift. There are underlying atrophic

changes with chronic ischemic changes in the deep white matter.

There is no ventricular dilatation.



Calvarial windows show no definite fracture. There is high

density fluid compatible with blood in the sphenoid sinuses as

well as a small amount of air in the cavernous sinuses, raising

the possibility of basilar skull fracture.



CT cervical spine findings:



There is no evidence of cervical spine fracture. There is no

subluxation or malalignment. There is  diffuse facet degenerative

change. There is  diffuse disc space narrowing at all levels.



IMPRESSION:



CT brain demonstrates an acute subdural hematoma along the right

frontotemporal convexity, with maximal diameter at 1 cm. This is

about 5 mm of right-to-left midline shift. There is underlying

atrophy and chronic change in the deep white matter. There is

high density fluid compatible with blood in the sphenoid sinuses

as well as some blood in the left maxillary sinus. There is a

small amount of intracranial air in the region of the cavernous

sinuses. This does raise the possibility of an occult basilar

skull fracture.



CT cervical spine shows degenerative findings but no acute

fracture or subluxation.



Dictated by: 



  Dictated on workstation # [id number]

## 2022-05-03 NOTE — DIAGNOSTIC IMAGING REPORT
HISTORY: 

Trauma, fall, chest injury.



TECHNIQUE: 

Frontal view of the chest.



COMPARISON: 

10/25/2020.



FINDINGS:

Lung volumes are normal. No consolidation is seen. There is no

pleural effusion or pneumothorax. The cardiac silhouette is

normal in size. Bones appear osteopenic. There are old right rib

deformities. No definite acute fracture is seen.



IMPRESSION:

1. No acute pulmonary abnormality.

2. Left humerus fracture is described separately.



Dictated by: 



  Dictated on workstation # [id number]

## 2022-05-03 NOTE — DIAGNOSTIC IMAGING REPORT
HISTORY: 

Trauma, left humerus injury.



TECHNIQUE: 

Two views of the left shoulder.



COMPARISON: 

None.



FINDINGS:

Bones appear osteopenic. There is an oblique mildly comminuted

fracture of the proximal left humerus diaphysis with lateral

displacement and marked medial angulation. There is about 2 cm of

overriding. The glenohumeral alignment appears normal.



IMPRESSION:

Displaced, angulated fracture of the proximal left humerus

diaphysis.



Dictated by: 



  Dictated on workstation # [id number]

## 2022-05-22 NOTE — DIAGNOSTIC IMAGING REPORT
EXAMINATION: Chest 1 view.



HISTORY: Hypoxia.



COMPARISON: 05/03/2022.



FINDINGS: Heart size and pulmonary vasculature are normal. The

lungs are clear without consolidation, pleural effusion or

pneumothorax. Minimal left basilar atelectasis. The osseous

structures are intact. Chronic right rib deformities.



IMPRESSION: No acute radiographic abnormality in the chest. 



Dictated by: 



  Dictated on workstation # [id number]

## 2022-05-22 NOTE — DIAGNOSTIC IMAGING REPORT
Exam: Abdominal radiograph with contrast.



Date: May 22, 2022.



Indication: 87-year-old female, evaluation of PEG tube

positioning. PEG tube was pulled out.



Comparison: None.



Findings:



There is administration of contrast through the PEG tube with

contrast extension into the stomach. There is no abnormal

extravasation of contrast. There is a high attenuation object

projecting over the right upper quadrant which may be external to

the patient although correlation is needed. There are no

abnormally distended gas-filled segments of bowel. There is no

identified free intraperitoneal air on supine assessment.



Impression:

1. The PEG tube is in the stomach.



Dictated by: 



  Dictated on workstation # [id number]

## 2022-05-22 NOTE — ED GI
General


Chief Complaint:  Abdominal/GI Problems


Stated Complaint:  PULLED PEG TUBE OUT


Nursing Triage Note:  


Arrives per EMS after pulling PEG tube out @ [facility].   Pt lifted to




stretcher and attached to NIBP and SpO2 monitors.   Pt remains in immobilizer 


due to previous fx of LUE.


Source of Information:  Nursing Home Records


Exam Limitations:  Physical Impairments





History of Present Illness


Date Seen by Provider:  May 22, 2022


Time Seen by Provider:  00:20


Initial Comments


Patient is an 87-year-old female who presents from a local nursing home chief 

complaint of PEG tube being dislodged this evening as well as her Aguirre becoming

dislodged.  Patient has history of recent fall with left shoulder fracture and 

subdural hematoma.  She is poorly communicative as a result of the head bleed.  

She is unable to provide HPI, past medical family or social history.


Timing/Duration:  1-3 Hours





Allergies and Home Medications


Allergies


Coded Allergies:  


     quetiapine (Verified  Adverse Reaction, Unknown, 2/11/19)


   confusion





Patient Home Medication List


Home Medication List Reviewed:  Yes


Amlodipine Besylate (Amlodipine Besylate) 5 Mg Tablet, 5 MG PO DAILY


   Prescribed by: NARENDRA ROSAS on 4/11/21 1506


Citalopram Hydrobromide (Citalopram HBr) 10 Mg Tablet, (Reported)


   Entered as Reported by: CLARICE GALVEZ on 4/7/21 1309


Polymyxin B Sulf/Trimethoprim (Polymyxin B-Tmp Eye Drops) 10 Ml Drops, 

(Reported)


   Entered as Reported by: CLARICE GALVEZ on 4/7/21 1309





Review of Systems


Review of Systems


Constitutional:  see HPI


Other Comments


Unable to get review of systems secondary to patient's physical impairments due 

to prior subdural hemorrhage





Past Medical-Social-Family Hx


Patient Social History


Tobacco Use?:  No


Substance use?:  No


Alcohol Use?:  No





Immunizations Up To Date


Tetanus Booster (TDap):  Unknown


PED Vaccines UTD:  No


First/Initial COVID19 Vaccinat:  YES


Second COVID19 Vaccination Brayden:  YES


Third COVID19 Vaccination Date:  YES





Seasonal Allergies


Seasonal Allergies:  No





Past Medical History


Surgeries:  No


Respiratory:  No


Currently Using CPAP:  No


Currently Using BIPAP:  No


Cardiac:  Yes (SEES DR. BUSBY UNSURE WHY)


Hypertension


Neurological:  Yes


Dementia


Reproductive Disorders:  No


Female Reproductive Disorders:  Denies


Sexually Transmitted Disease:  No


HIV/AIDS:  No


Genitourinary:  Yes


UTI-Chronic


Gastrointestinal:  No


Musculoskeletal:  Yes


Fractures, Gout


Endocrine:  Yes


Hyperthyroidism


HEENT:  Yes


Cataract


Loss of Vision:  Denies


Hearing Impairment:  Hard of Hearing


Cancer:  No


Psychosocial:  No


Integumentary:  No


Recent Skin Changes


Blood Disorders:  No


Adverse Reaction/Blood Tranf:  No





Family Medical History





Dementia


  G8 SISTER


Diabetes mellitus


  19 FATHER


No Pertinent Family Hx, Diabetes, Psychiatric Problems





Physical Exam


Vital Signs





Vital Signs - First Documented








 5/22/22





 00:00


 


Temp 36.9


 


Pulse 96


 


Resp 20


 


B/P (MAP) 148/68 (94)


 


O2 Delivery Room Air





Capillary Refill : Less Than 3 Seconds


Height/Weight/BMI


Height: 5'4.00"


Weight: 117lbs. 8.0oz. [id number]; 21.00 BMI


Method:Stated


General Appearance:  cachetic, thin


Respiratory:  no respiratory distress, no accessory muscle use


Cardiovascular:  regular rate, rhythm


Gastrointestinal:  soft, other (Obvious stoma left upper quadrant of abdomen 

where PEG tube had been no surrounding erythema no drainage noted)


Extremities:  other (Left shoulder immobilized)


Neurologic/Psychiatric:  alert, disoriented x 3, other (No acute distress)


Skin:  warm/dry, pallor





Procedures/Interventions





   Suture Size:  5-0





Progress/Results/Core Measures


Results/Orders


My Orders





Orders - PABLO CEE MD


Peg Tube Check (5/22/22 00:46)





Vital Signs/I&O











 5/22/22





 00:00


 


Temp 36.9


 


Pulse 96


 


Resp 20


 


B/P (MAP) 148/68 (94)


 


O2 Delivery Room Air














Blood Pressure Mean:                    94











Progress


Progress Note :  


   Time:  01:05


Progress Note


20 Greek PEG tube placed back into the stoma.  KUB pending with Gastrografin in

order to confirm placement.





Diagnostic Imaging





   Diagonstic Imaging:  Xray


Comments


KUB interpreted by me:  Gastrograffin in the stomach. PEG appears appropriately 

placed





Departure


Impression





   Primary Impression:  


   PEG (percutaneous endoscopic gastrostomy) adjustment/replacement/removal


Disposition:  01 HOME, SELF-CARE


Condition:  Improved





Departure-Patient Inst.


Decision time for Depature:  01:06


Referrals:  


NICOLASA MACHADO DO (PCP/Family)


Primary Care Physician





Add. Discharge Instructions:  


Resume prior nursing home orders.





Return to the emergency department for any new, concerning or emergent 

complaints.











PABLO CEE MD         May 22, 2022 00:24

## 2022-05-22 NOTE — ED GENERAL
General


Chief Complaint:  General Problems/Pain


Stated Complaint:  VOMITING


Nursing Triage Note:  


ARRIVES VIA EMS TO ROOM 5 AT THIS TIME. ALERT AND ORIENTED TO VOICE. HX OF 


DEMENTIA. [facility] AND REPORTED TO HAVE VOMITED




AFTER BEING FED EVERY HOUR AS USUAL THROUGH HER FEEING TUBE. THIS SAME PATIENT 


HAD A RECENT ED ADMISSION FOR PULLING OUT HER MARRUFO CATHETER ADN HER GASTRIC 


TUBE.


Source of Information:  Patient, EMS, Old Records


Exam Limitations:  Other (Chronic verbal deficits)





History of Present Illness


Date Seen by Provider:  May 22, 2022


Time Seen by Provider:  16:23


Initial Comments


This 87-year-old resident of [facility] presents to the emergency room 

via EMS with primary complaint of vomiting.  She does have some significant 

dysphagia and also has a PEG tube and usually has indwelling Marrufo catheter.  

She was seen in this ER last night after removing her own PEG tube and Marruof 

catheter.  PEG tube was replaced by Marrufo catheter was left out at nursing home 

request.  On arrival she is found to be febrile.  PEG tube was tested yesterday 

with fluoroscopy and x-ray.  There were no concerns about placement or 

functionality at that time.  The nursing home has reportedly been providing 

frequent feeds today and patient began vomiting.  She is febrile on arrival and 

has coarse breath sounds.  During my assessment she is hypoxic with oxygen 

saturations of 88%.  Patient has dementia and minimally communicates as a 

result.  She is able to answer some questions and denies pain or nausea at this 

time.  Patient is also in a shoulder immobilizer from a recent humerus fracture 

on the left.





Allergies and Home Medications


Allergies


Coded Allergies:  


     melatonin (Verified  Allergy, Unknown, 22)


     quetiapine (Verified  Adverse Reaction, Unknown, 19)


   confusion





Patient Home Medication List


Home Medication List Reviewed:  Yes


Amlodipine Besylate (Amlodipine Besylate) 5 Mg Tablet, 5 MG PO DAILY


   Prescribed by: NARENDRA ROSAS on 21 1506


Citalopram Hydrobromide (Citalopram HBr) 10 Mg Tablet, (Reported)


   Entered as Reported by: CLARICE GALVEZ on 21 1309


Polymyxin B Sulf/Trimethoprim (Polymyxin B-Tmp Eye Drops) 10 Ml Drops, 

(Reported)


   Entered as Reported by: CLARICE GALVEZ on 21 1309





Review of Systems


Review of Systems


Constitutional:  see HPI


EENTM:  see HPI


Respiratory:  no symptoms reported


Cardiovascular:  no symptoms reported


Gastrointestinal:  see HPI


Genitourinary:  see HPI


Pregnant:  No


Musculoskeletal:  no symptoms reported


Skin:  no symptoms reported


Psychiatric/Neurological:  See HPI


Hematologic/Lymphatic:  No Symptoms Reported


Immunological/Allergic:  no symptoms reported





Past Medical-Social-Family Hx


Patient Social History


Smoking Status:  Unknown if Ever Smoked


Substance use?:  No


Alcohol Use?:  No





Immunizations Up To Date


Tetanus Booster (TDap):  Unknown


PED Vaccines UTD:  No


First/Initial COVID19 Vaccinat:  YES


Second COVID19 Vaccination Brayden:  YES


Third COVID19 Vaccination Date:  YES





Seasonal Allergies


Seasonal Allergies:  No





Past Medical History


Surgeries:  Yes


Abdominal (PEG tube)


Respiratory:  No


Currently Using CPAP:  No


Currently Using BIPAP:  No


Cardiac:  Yes (SEES DR. BUSBY UNSURE WHY)


Hypertension


Neurological:  Yes


Dementia


Reproductive Disorders:  No


Female Reproductive Disorders:  Denies


Sexually Transmitted Disease:  No


HIV/AIDS:  No


Genitourinary:  Yes


UTI-Chronic


Gastrointestinal:  No


Musculoskeletal:  Yes


Fractures, Gout


Endocrine:  Yes


Hyperthyroidism


HEENT:  Yes


Cataract


Loss of Vision:  Denies


Hearing Impairment:  Hard of Hearing


Cancer:  No


Psychosocial:  No


Integumentary:  No


Recent Skin Changes


Blood Disorders:  No


Adverse Reaction/Blood Tranf:  No





Family Medical History





Dementia


  G8 SISTER


Diabetes mellitus


  19 FATHER


No Pertinent Family Hx, Diabetes, Psychiatric Problems





Physical Exam-Suspected Sepsis


Physical Exam


Vital Signs





Vital Signs - First Documented








 22





 16:28 20:48


 


Temp 38.4 


 


Pulse 104 


 


Resp 18 


 


B/P (MAP) 176/86 (116) 


 


Pulse Ox 96 


 


O2 Delivery Nasal Cannula 


 


O2 Flow Rate  15.00





Capillary Refill : Less Than 3 Seconds








Blood Pressure Mean:                    116








Height, Weight, BMI


Height: 5'4.00"


Weight: 117lbs. 8.0oz. [id number]; 21.00 BMI


Method:Stated


General Appearance:  WD/WN, Mild Distress (Appears uncomfortable, coarse 

respirations)


HEENT:  PERRL/EOMI, Normal ENT Inspection


Neck:  Normal Inspection


Respiratory:  No Accessory Muscle Use, No Respiratory Distress, Rhonci, Wheezing


Cardiovascular:  Regular Rate, Rhythm, No Edema, No Murmur


Gastrointestinal:  Normal Bowel Sounds, Non Tender, Soft, Other (PEG tube in 

place with some feed within the tube and some feed leaking around the ostomy 

site.  No inflammatory changes and no tenderness.)


Extremity:  Normal Inspection, No Pedal Edema


Neurologic/Psychiatric:  Alert, Other (Significant cognitive and speech deficits

from dementia)


Skin:  normal color, warm/dry





Focused Exam


Lactate Level


22 16:45: Lactic Acid Level 1.19





Lactic Acid Level








Procedures/Interventions





   Suture Size:  5-0





Progress/Results/Core Measures


Suspected Sepsis


SIRS


Temperature: 


Pulse: 104 


Respiratory Rate: 18


 


Laboratory Tests


22 16:45: White Blood Count 16.6H


Blood Pressure 176 /86 


Mean: 116


 


22 16:45: Lactic Acid Level 1.19


Laboratory Tests


22 16:45: 


Creatinine 1.06, INR Comment 1.0, Platelet Count 353, Total Bilirubin 1.0








Results/Orders


Lab Results





Laboratory Tests








Test


 22


16:29 22


16:45 22


18:16 Range/Units


 


 


Influenza Type A (RT-PCR) Not Detected    Not Detecte  


 


Influenza Type B (RT-PCR) Not Detected    Not Detecte  


 


SARS-CoV-2 RNA (RT-PCR) Not Detected    Not Detecte  


 


White Blood Count


 


 16.6 H


 


 4.3-11.0


10^3/uL


 


Red Blood Count


 


 3.49 L


 


 3.80-5.11


10^6/uL


 


Hemoglobin  10.6 L  11.5-16.0  g/dL


 


Hematocrit  32 L  35-52  %


 


Mean Corpuscular Volume  91   80-99  fL


 


Mean Corpuscular Hemoglobin  30   25-34  pg


 


Mean Corpuscular Hemoglobin


Concent 


 33 


 


 32-36  g/dL





 


Red Cell Distribution Width  14.7 H  10.0-14.5  %


 


Platelet Count


 


 353 


 


 130-400


10^3/uL


 


Mean Platelet Volume  9.2   9.0-12.2  fL


 


Immature Granulocyte % (Auto)  1    %


 


Neutrophils (%) (Auto)  89 H  42-75  %


 


Lymphocytes (%) (Auto)  4 L  12-44  %


 


Monocytes (%) (Auto)  6   0-12  %


 


Eosinophils (%) (Auto)  0   0-10  %


 


Basophils (%) (Auto)  0   0-10  %


 


Neutrophils # (Auto)


 


 14.8 H


 


 1.8-7.8


10^3/uL


 


Lymphocytes # (Auto)


 


 0.7 L


 


 1.0-4.0


10^3/uL


 


Monocytes # (Auto)


 


 1.0 


 


 0.0-1.0


10^3/uL


 


Eosinophils # (Auto)


 


 0.0 


 


 0.0-0.3


10^3/uL


 


Basophils # (Auto)


 


 0.0 


 


 0.0-0.1


10^3/uL


 


Immature Granulocyte # (Auto)


 


 0.1 


 


 0.0-0.1


10^3/uL


 


Neutrophils % (Manual)  92    %


 


Lymphocytes % (Manual)  4    %


 


Monocytes % (Manual)  4    %


 


Eosinophils % (Manual)  0    %


 


Basophils % (Manual)  0    %


 


Band Neutrophils  0    %


 


Blood Morphology Comment  NORMAL    


 


Prothrombin Time  13.2   12.2-14.7  SEC


 


INR Comment  1.0   0.8-1.4  


 


Activated Partial


Thromboplast Time 


 27 


 


 24-35  SEC





 


Sodium Level  135   135-145  MMOL/L


 


Potassium Level  4.6   3.6-5.0  MMOL/L


 


Chloride Level  97 L    MMOL/L


 


Carbon Dioxide Level  22   21-32  MMOL/L


 


Anion Gap  16 H  5-14  MMOL/L


 


Blood Urea Nitrogen  47 H  7-18  MG/DL


 


Creatinine


 


 1.06 


 


 0.60-1.30


MG/DL


 


Estimat Glomerular Filtration


Rate 


 51 


 


  





 


BUN/Creatinine Ratio  44    


 


Glucose Level  142 H    MG/DL


 


Lactic Acid Level


 


 1.19 


 


 0.50-2.00


MMOL/L


 


Calcium Level  9.9   8.5-10.1  MG/DL


 


Corrected Calcium  10.2 H  8.5-10.1  MG/DL


 


Total Bilirubin  1.0   0.1-1.0  MG/DL


 


Aspartate Amino Transf


(AST/SGOT) 


 28 


 


 5-34  U/L





 


Alanine Aminotransferase


(ALT/SGPT) 


 37 


 


 0-55  U/L





 


Alkaline Phosphatase  112     U/L


 


C-Reactive Protein High


Sensitivity 


 2.94 H


 


 0.00-0.50


MG/DL


 


Total Protein  7.0   6.4-8.2  GM/DL


 


Albumin  3.6   3.2-4.5  GM/DL


 


Lipase  19   8-78  U/L


 


Procalcitonin  0.33 H  <0.10  NG/ML


 


Urine Color   YELLOW   


 


Urine Clarity   CLOUDY   


 


Urine pH   7.0  5-9  


 


Urine Specific Gravity   1.020  1.016-1.022  


 


Urine Protein   2+ H NEGATIVE  


 


Urine Glucose (UA)   NEGATIVE  NEGATIVE  


 


Urine Ketones   NEGATIVE  NEGATIVE  


 


Urine Nitrite   NEGATIVE  NEGATIVE  


 


Urine Bilirubin   NEGATIVE  NEGATIVE  


 


Urine Urobilinogen   0.2  < = 1.0  MG/DL


 


Urine Leukocyte Esterase   3+ H NEGATIVE  


 


Urine RBC (Auto)   2+ H NEGATIVE  


 


Urine RBC   10-25 H  /HPF


 


Urine WBC   TNTC H  /HPF


 


Urine Crystals   NONE   /LPF


 


Urine Bacteria   LARGE H  /HPF


 


Urine Casts   NONE   /LPF


 


Urine Mucus   NEGATIVE   /LPF


 


Urine Culture Indicated


 


 


 CULTURE


PENDING  











My Orders





Orders - GRICEL HILL MD


Covid 19 Inhouse Test (22 16:34)


Influenza A And B By Pcr (22 16:34)


Cbc With Automated Diff (22 16:34)


Comprehensive Metabolic Panel (22 16:34)


Blood Culture (22 16:34)


Sputum Culture (22 16:34)


Urinalysis (22 16:34)


Urine Culture (22 16:34)


Protime With Inr (22 16:34)


Partial Thromboplastin Time (22 16:34)


Chest 1 View, Ap/Pa Only (22 16:34)


Ed Iv/Invasive Line Start (22 16:34)


Ed Iv/Invasive Line Start (22 16:34)


Vital Signs Adult Sepsis Patie Q15M (22 16:34)


O2 (22 16:34)


Remove Rings In Anticipation O (22 16:34)


Lactic Acid Analyzer (22 16:34)


Hs C Reactive Protein (22 16:34)


Lipase (22 16:34)


Procalcitonin (Pct) (22 16:34)


Ondansetron Injection (Zofran Injectio (22 16:45)


Manual Differential (22 16:45)


Code/Resuscitation (22 19:12)





Medications Given in ED





Current Medications








 Medications  Dose


 Ordered  Sig/Familia


 Route  Start Time


 Stop Time Status Last Admin


Dose Admin


 


 Acetaminophen  850 mg  ONCE  ONCE


 PO  22 18:30


 22 18:31 DC 22 19:35


850 MG


 


 Cefepime HCl 1000


 mg/Sodium Chloride  50 ml @ 


 100 mls/hr  ONCE  ONCE


 IV  22 18:30


 22 18:59 DC 22 19:33


100 MLS/HR


 


 Ondansetron HCl  4 mg  ONCE  ONCE


 IVP  22 16:45


 22 16:47 DC 22 17:17


4 MG








Vital Signs/I&O











 22





 16:28 17:55 20:48


 


Temp 38.4 37.4 37.5


 


Pulse 104  108


 


Resp 18  21


 


B/P (MAP) 176/86 (116)  105/53 (70)


 


Pulse Ox 96  90


 


O2 Delivery Nasal Cannula  Nasal Cannula


 


O2 Flow Rate   15.00





Capillary Refill : Less Than 3 Seconds








Blood Pressure Mean:                    116








Progress Note #1:  


   Time:  18:50


Progress Note


Patient was seen and examined upon arrival.  Respiratory therapy staff attended 

to her breathing and was able to improve her secretions and saturation.  Chest 

x-ray was unremarkable.  She was found to have elevated WBC.  Source of 

infection seems to be urinary tract infection based on urinalysis.  Dr. Velazquez evaluated the patient as well and found her to have a similar 

subjective status to last night.  Cefepime was ordered for initial antibiotic 

therapy after blood cultures and lactic acid were obtained.  A liter of IV 

fluids is infusing.


Progress Note #2:  


   Time:  19:25


Progress Note


I had a long conversation with the patient's daughter about her declining status

since having recent falls and being transferred to  at the beginning of the 

month.  We discussed her CODE STATUS as it is presently full code for the 

nursing home but had been DNR at [facility].  I reviewed risks and benefits of 

resuscitation scenarios such as CPR and intubation.  I expressed concern about 

potential for significant harm during these events without much benefit for 

long-term quality of life.  Daughter appreciated this conversation and these 

concerns.  She shares the concerns and would like to have her admitted with a DO

NOT RESUSCITATE status.  Patient has some significant behavioral problems and is

currently requiring a sitter to maintain safety.  She will likely need some help

with establishing services when patient is discharged.





Diagnostic Imaging





   Diagonstic Imaging:  Xray


   Plain Films/CT/US/NM/MRI:  chest


Comments


Chest x-ray viewed by me and report reviewed.  See report below:





NAME:   DARRION GARCIA HAZEL


[facility] REC#:   [id number]


ACCOUNT#:   [id number]


PT STATUS:   REG ER


:   1935


PHYSICIAN:   GRICEL HILL MD


ADMIT DATE:   22/ER


***Signed***


Date of Exam:22





CHEST 1 VIEW, AP/PA ONLY








EXAMINATION: Chest 1 view.





HISTORY: Hypoxia.





COMPARISON: 2022.





FINDINGS: Heart size and pulmonary vasculature are normal. The


lungs are clear without consolidation, pleural effusion or


pneumothorax. Minimal left basilar atelectasis. The osseous


structures are intact. Chronic right rib deformities.





IMPRESSION: No acute radiographic abnormality in the chest. 





Dictated by: 





  Dictated on workstation # [id number]








Dict:   22 172


Trans:   22


[facility] [id number]





Interpreted by:     SANCHEZ HUNTER DO


Electronically signed by: SANCHEZ HUNTER DO 22





Departure


Communication (Admissions)


Time/Spoke to Admitting Phy:  18:53


Dr. Gandhi





Impression





   Primary Impression:  


   Sepsis


   Qualified Codes:  A41.9 - Sepsis, unspecified organism


   Additional Impressions:  


   Urinary tract infection


   Qualified Codes:  N39.0 - Urinary tract infection, site not specified


   Aspiration into airway


   Qualified Codes:  T17.908A - Unspecified foreign body in respiratory tract, 

   part unspecified causing other injury, initial encounter


   Vomiting


   Qualified Codes:  R11.10 - Vomiting, unspecified


Disposition:  09 ADMITTED AS INPATIENT


Condition:  Improved





Admissions


Decision to Admit Reason:  Admit from ER (General)


Decision to Admit/Date:  May 22, 2022


Time/Decision to Admit Time:  18:53





Departure-Patient Inst.


Referrals:  


NICOLASA MACHADO DO (PCP/Family)


Primary Care Physician











GRICEL HILL MD        May 22, 2022 18:38

## 2022-05-23 NOTE — DIAGNOSTIC IMAGING REPORT
INDICATION: Sepsis and vomiting.



Comparison is made with prior examination of 05/22/2022.



FINDINGS: The heart size is stable. There are patchy bibasilar

infiltrates, right greater than left. There is no pleural

effusion or pneumothorax. The mediastinum is unremarkable.



IMPRESSION: Increasing bibasilar infiltrates, right greater than

left, suspect for pneumonia.



No other acute cardiopulmonary abnormality.



Dictated by: 



  Dictated on workstation # [id number]

## 2022-05-23 NOTE — DIAGNOSTIC IMAGING REPORT
INDICATION: PEG tube placement.



FINDINGS: Small amount of contrast was injected via patient's PEG

tube. This all appears to be kinked contained within the stomach

and proximal small bowel. Bowel gas pattern is otherwise

nonspecific. Lung bases are clear. No free air.



IMPRESSION: The PEG tube appears to be in satisfactory position.



Dictated by: 



  Dictated on workstation # [id number]

## 2022-05-23 NOTE — HISTORY & PHYSICAL-HOSPITALIST
History of Present Illness


HPI/Chief Complaint


Kely Kulkarni is an 87 year old female with PMH HTN, dementia, 

dysphagia s/p PEG placement, chronic indwelling constantino catheter, who presented 

with vomiting. She lives at [facility]. The day before she had pulled 

out her PEG and constantino. She had it replaced in the ER. Upon arrival this visit, 

she was found to be febrile. She is a poor historian and is unable to provide 

any meaningful history. Upon my exam, she reports pain in her left arm. She 

denies shortness of breath. She denies chest pain. She is only oriented to self.

There is no family at the bedside.


Source:  patient, RN/MD


Exam Limitations:  clinical condition


Date Seen


5/23/22


Time Seen by a Provider:  10:00


Attending Physician


Bryant Hare DO


PCP


Admitting Physician:


Zarina Gandhi MD 








Attending Physician:


Zarina Gandhi MD


Referring Physician





Date of Admission


May 22, 2022 at 19:21





Home Medications & Allergies


Home Medications


Reviewed patient Home Medication Reconciliation performed by pharmacy medication

reconciliations technician and/or nursing.


Patients Allergies have been reviewed.





Allergies





Allergies


Coded Allergies


  melatonin (Verified Allergy, Unknown, 5/22/22)


  quetiapine (Verified Adverse Reaction, Unknown, 2/11/19)


    confusion








Past Medical-Social-Family Hx


Patient Social History


Smoking Status:  Unknown if Ever Smoked


Substance use?:  No


Alcohol Use?:  No


Pt feels they are or have been:  Unable to obtain





Immunizations Up To Date


Date of Influenza Vaccine:  Oct 1, 2017


First/Initial COVID19 Vaccinat:  YES


Second COVID19 Vaccination Brayden:  YES


Tetanus Booster (TDap):  Less Than 5 Years


Hepatitis A:  No


Hepatitis B:  No


PED Vaccines UTD:  No


Date of Pneumonia Vaccine:  Oct 1, 2017





Seasonal Allergies


Seasonal Allergies:  No





Current Status


Pregnancy status:  No


Advance Directives:  Unable to obtain


Communicates:  Does Not Communicate


Primary Language:  English


Preferred Spoken Language:  English





Past Medical History


Surgeries:  Abdominal (PEG tube)


Currently Using CPAP:  No


Currently Using BIPAP:  No


Hypertension


Dementia


Sexually Transmitted Disease:  No


HIV/AIDS:  No


UTI-Chronic


Fractures, Gout


Hyperthyroidism


Cataract


Loss of Vision:  Denies


Hearing Impairment:  Hard of Hearing


Recent Skin Changes


Blood Disorders:  No


Adverse Reaction/Blood Tranf:  No





PMHx:


HTN


HLD





PSurgHx: none





Family Medical History





Dementia


  G8 SISTER


Diabetes mellitus


  19 FATHER


No Pertinent Family Hx, Diabetes, Psychiatric Problems





Review of Systems


Constitutional:  see HPI





Physical Exam


Physical Exam


Vital Signs





Vital Signs - First Documented








 5/22/22 5/22/22





 16:28 21:51


 


Temp 38.4 


 


Pulse 104 


 


Resp 18 


 


B/P (MAP) 176/86 (116) 


 


Pulse Ox 96 


 


O2 Delivery Nasal Cannula 


 


FiO2  70





Capillary Refill : Less Than 3 Seconds


Height, Weight, BMI


Height: 5'4.00"


Weight: 117lbs. 8.0oz. 53.251654cu; 18.06 BMI


Method:Stated


General Appearance:  Chronically ill, Thin


HEENT:  PERRL/EOMI, Pharynx Normal


Neck:  Normal Inspection, Supple


Respiratory:  No Respiratory Distress, Decreased Breath Sounds


Cardiovascular:  Regular Rate, Rhythm, No Murmur


Gastrointestinal:  Normal Bowel Sounds, Non Tender, Soft


Extremity:  No Pedal Edema, Other (left arm immobilized)


Neurologic/Psychiatric:  Alert, Disoriented


Skin:  Normal Color, Warm/Dry





Results


Results/Procedures


Labs


Laboratory Tests


5/22/22 16:45








5/23/22 04:45








Patient resulted labs reviewed.


Imaging:  Reviewed Imaging Report





Assessment/Plan


Admission Diagnosis


Sepsis due to pneumonia


Admission Status:  Inpatient Order (span 2 midnights)


Reason for Inpatient Admission:  


IV antibiotics and fluids





Assessment and Plan


Sepsis due to pneumonia


Acute respiratory failure with hypoxia


UTI


   SIRS+ with fever and leukocytosis


   CXR with bibasilar infiltrates concerning for pneumonia


   Started on Cefepime


   IV fluids


   UA concerning for UTI


   Urine culture pending


   Supplemental oxygen as needed





Dysphagia


PEG dependence


   Meds per tube


   Dietary consulted


   Plan to resume tube feeds tomorrow





Dementia


   High risk for delirium


   Ativan as needed for agitation





Humerus fracture


   Previous diagnosis, not acute this admission


   Immobilize left arm





Debility


   PT/OT





DVT prophylaxis: Lovenox





Diagnosis/Problems


Diagnosis/Problems





(1) Sepsis


Status:  Acute


Qualifiers:  


   Sepsis type:  sepsis due to unspecified organism  Sepsis acute organ 

dysfunction status:  without acute organ dysfunction  Qualified Codes:  A41.9 - 

Sepsis, unspecified organism


(2) PNA (pneumonia)


Status:  Acute


(3) Acute respiratory failure with hypoxia


Status:  Acute


(4) Urinary tract infection


Status:  Acute


Qualifiers:  


   Urinary tract infection type:  site unspecified  Hematuria presence:  without

hematuria  Qualified Codes:  N39.0 - Urinary tract infection, site not specified


(5) PEG (percutaneous endoscopic gastrostomy) status


Status:  Chronic


(6) Dysphagia


Status:  Chronic


(7) Severe protein-calorie malnutrition


Status:  Chronic


(8) Dementia


Status:  Chronic











MICHAEL KAY MD              May 23, 2022 19:05

## 2022-05-24 NOTE — OCCUPATIONAL THERAPY EVAL
OT Evaluation-General/PLF


Medical Diagnosis


Admission Date


May 22, 2022 at 19:21


Medical Diagnosis:  sepsis,vomiting,UTI


Onset Date:  May 22, 2022





Therapy Diagnosis


Therapy Diagnosis:  decreased ADL status





Height/Weight


Height (Feet):  5


Height (Inches):  4.00


Weight (Pounds):  117


Weight (Ounces):  8.0





Precautions


Precautions/Isolations:  Fall Prevention, Standard Precautions





Referral


Physician:  Yeny


Referral Reason:  Evaluation/Treatment





Medical History


Pertinent Medical History:  Arthritis, Dementia, HTN


Additional Medical History


HTN, dementia, dysphagia s/p PEG, chronic indwelling catheter, hyperthyroidism, 

GOUT


Current History


ED due to vomiting, per report pt had pulled out PEG and catheter. Pt found to 

be febrile.





Social History


Home:  Nursing Home





ADL-Prior Level of Function


SCALE: Activities may be completed with or without assistive devices.





6-Indepedent-patient completes the activity by him/herself with no assistance 

from a helper.


5-Set-up or Clean-up Assistance-helper sets up or cleans up; patient completes 

activity. Lawrence assists only prior to or  


    following the activity.


4-Supervision or Touching Assistance-helper provides verbal cues and/or 

touching/steadying and/or contact guard assistance as patient completes 

activity. Assistance may be provided   


    throughout the activity or intermittently.


3-Partial/Moderate Assistance-helper does LESS THAN HALF the effort. Lawrence 

lifts, holds or supports trunk or limbs, but provides less than half the effort.


2-Substantial/Maximal Assistance-helper does MORE THAN HALF the effort. Lawrence 

lifts or holds trunk or limbs and provides more than half the effort.


7-Nzvfpybiv-fsfqam does ALL the effort. Patient does none of the effort to 

complete the activity. Or, the assistance of 2 or more helpers is required for 

the patient to complete the  


    activity.


If activity was not attempted, code reason:


7-Patient Refused.


9-Not Applicable-not attempted and the patient did not perform the activity be

fore the current illness, exacerbation or injury.


10-Not Attempted due to Environmental Limitations-(lack of equipment, weather 

restraints, etc.).


88-Not Attempted due to Medical Conditions or Safety Concerns.


ADL PLOF Comments


PT talked with NH, per report pt is nonambulatory at PLOF and is dependent with 

all bed mobility and transfers. Pt would require assistance with all ADLs.


Self Care:  Needed Some Help


Functional Cognition:  Independent





OT Current Status


Subjective


Pt in bed, communicated minimally throughout tx. Pt guarding LUE due to humerus 

fx (per nursing report)





Mental Status/Objective


Patient Orientation:  Confused


Attachments:  Aguirre Catheter, IV





Current


Upper Extremity ROM


RUE WFL, LUE not tested due to reports of humerus fx.


Upper Extremity Strength


unable to assess due to confusion and difficulty following directions.





ADL-Treatment


Eating (QC):  9 (Pt currently NPO and had PEG tube prior to admission)


Shower/Bathe Self (QC):  1


Upper Body Dressing (QC):  1


Lower Body Dressing (QC):  1


On/Off Footwear (QC):  1


Toileting Hygiene (QC):  1





Other Treatments


Pt in bed, communicated minimally with OT during evaluation. Pt had live sitter 

present throughout tx. Pt able to move RUE WFL, LUE not tested due to humerus 

fracture. Pt had difficulty following instruction, and would require total 

assistance with ADLs at time of evaluation. RUE ROM performed, all planes. Post 

tx, pt in bed, live sitter present, all needs met.





Education


OT Patient Education:  Correct positioning, Energy conservation, Modified ADL 

techniques, Progress toward Goal/Update tx plan, Purpose of tx/functional 

activities, Rehab process


Teaching Recipient:  Patient


Teaching Methods:  Discussion


Response to Teaching:  Unable to Comprehend





OT Long Term Goals


Long Term Goals


1=Demonstrate adherence to instructed precautions during ADL tasks.


2=Patient will verbalize/demonstrate understanding of assistive 

devices/modifications for ADL.


3=Patient will improve strength/tolerance for activity to enable patient to 

perform ADL's.





OT Education/Plan


Problem List/Assessment


Assessment:  No Skilled OT Needs ID'd


No skilled OT services indicated at this time, as pt is dependent with all ADLs 

and at [facility]. D/C from OT.





Discharge Recommendations


Plan/Recommendations:  Discharge/Goals Met





Treatment Plan/Plan of Care


Patient would benefit from OT for education, treatment and training to promote 

independence in ADL's, mobility, safety and/or upper extremity function for 

ADL's.


Plan of Care:  ADL Retraining, UE Funct Exercise/Act


Treatment Duration:  May 24, 2022


Frequency:  1 time per week (eval only)


Rehab Potential:  Guarded





Time/GCodes


Start Time:  09:42


Stop Time:  09:50


Total Time Billed (hr/min):  8


Billed Treatment Time


1, DANIELLE GUZMÁN OT          May 24, 2022 10:52

## 2022-05-24 NOTE — PHYSICAL THERAPY EVALUATION
PT Evaluation-General


Medical Diagnosis


Admission Date


May 22, 2022 at 19:21


Medical Diagnosis:  sepsis,vomiting,UTI


Onset Date:  May 22, 2022





Therapy Diagnosis


Therapy Diagnosis:  debility





Height/Weight


Height (Feet):  5


Height (Inches):  4.00


Weight (Pounds):  117


Weight (Ounces):  8.0





Precautions


Precautions/Isolations:  Fall Prevention, Standard Precautions





Referral


Physician:  Yeny


Reason for Referral:  Evaluation/Treatment





Medical History


Pertinent Medical History:  Arthritis, Dementia, HTN


Additional Medical History


left humeral fracture with immobilizer


Current History


EMS from NH due to vomiting (per report patient pulled out PEG and constantino)


Reviewed History:  Yes





Social History


Home:  Nursing Home





Prior


Prior Level of Function


SCALE: Activities may be completed with or without assistive devices.





6-Indepedent-patient completes the activity by him/herself with no assistance 

from a helper.


5-Set-up or Clean-up Assistance-helper sets up or cleans up; patient completes 

activity. Iliff assists only prior to or  


    following the activity.


4-Supervision or Touching Assistance-helper provides verbal cues and/or 

touching/steadying and/or contact guard assistance as patient completes 

activity. Assistance may be provided   


    throughout the activity or intermittently.


3-Partial/Moderate Assistance-helper does LESS THAN HALF the effort. Iliff 

lifts, holds or supports trunk or limbs, but provides less than half the effort.


2-Substantial/Maximal Assistance-helper does MORE THAN HALF the effort. Iliff 

lifts or holds trunk or limbs and provides more than half the effort.


5-Tcljrfaew-wzpjms does ALL the effort. Patient does none of the effort to 

complete the activity. Or, the assistance of 2 or more helpers is required for 

the patient to complete the  


    activity.


If activity was not attempted, code reason:


7-Patient Refused.


9-Not Applicable-not attempted and the patient did not perform the activity 

before the current illness, exacerbation or injury.


10-Not Attempted due to Environmental Limitations-(lack of equipment, weather 

restraints, etc.).


88-Not Attempted due to Medical Conditions or Safety Concerns.


Bed Mobility:  1


Transfers (B,C,W/C):  1


Gait:  9


Stairs:  9


Wheelchair Mobility:  9


Indoor Mobility (Ambulation):  Not Applicalbe


Stairs:  Not Applicalbe


nonambulatory PLOF per nursing home report





PT Evaluation-Current


Subjective


Due to patient advanced dementia, this PT contacted NH on patient PLOF.  Per NH 

staff, patient is dependent with all bed mobility and transfers and is non 

ambulatory.





Objective


Patient Orientation:  Confused


Attachments:  Constantino Catheter, IV





ROM/Strength


ROM Lower Extremities


bilateral LE  WFL


Strength Lower Extremities


NT due to patient inability to follow direction





Integumentary/Posture


Bowel Incontinence:  Yes


Bladder Incontinence:  Constantino Cath


Posture


kyphotic





Neuromuscular


(Tone, Coordination, Reflexes)


diminished coordination





Sensory


Vision:  Unable to Assess


Hearing:  Unable to Assess





Transfers


Roll Left to Right (QC):  1


Sit to Lying (QC):  1


Lying to Sitting/Side of Bed(Q:  1


sat EOB to attempt to place immobilizer for left humeral fracture however, 

patient became agitated.  Returned patient to bed with live sitter present.





Gait


Does the Patient Walk?:  No and Walking Goal NOT indicated





Balance


Sitting Static:  Poor


Sitting Dynamic:  Poor





Assessment/Needs


Due to patient advanced dementia and non ambulatory PLOF, No skilled PT 

indicated at this time.


Rehab Potential:  Guarded





PT Plan


Treatment/Plan


Treatment Plan:  Discontinue PT


Treatment Duration:  May 24, 2022


Frequency:  1 time per week


Estimated Hrs Per Day:  .25 hour per day





Time/GCodes


Time In:  830


Time Out:  844


Total Billed Treatment Time:  14


Total Billed Treatment


1 visit


EVMod 14 min











NICKI DERAS PT              May 24, 2022 09:43

## 2022-05-24 NOTE — PROGRESS NOTE - HOSPITALIST
Subjective


HPI/CC On Admission


Date Seen by Provider:  May 24, 2022


Time Seen by Provider:  10:30


Kely Kulkarni is an 87 year old female with PMH HTN, dementia, 

dysphagia s/p PEG placement, chronic indwelling constantino catheter, who presented 

with vomiting. She lives at [facility]. The day before she had pulled 

out her PEG and constantino. She had it replaced in the ER. Upon arrival this visit, 

she was found to be febrile. She is a poor historian and is unable to provide 

any meaningful history. Upon my exam, she reports pain in her left arm. She 

denies shortness of breath. She denies chest pain. She is only oriented to self.

There is no family at the bedside.


Subjective/Events-last exam


She is oriented to self. She denies pain. She denies shortness of breath. She is

unable to provide much information. She appears uncomfortable and agitated. She 

has family present at the bedside and her daughter was updated via phone.





Focused Exam


Lactate Level


5/22/22 16:45: Lactic Acid Level 1.19








Objective


Exam


Vital Signs





Vital Signs








  Date Time  Temp Pulse Resp B/P (MAP) Pulse Ox O2 Delivery O2 Flow Rate FiO2


 


5/24/22 16:20 37.5 108 22 132/58 (82) 94 Vapotherm 60.00 





       30.00 


 


5/24/22 14:39        60





Capillary Refill : Less Than 3 Seconds


General Appearance:  Chronically ill, Moderate Distress (uncomfortable 

appearing), Thin


Respiratory:  No Respiratory Distress, Decreased Breath Sounds


Cardiovascular:  Regular Rate, Rhythm, No Murmur


Gastrointestinal:  Normal Bowel Sounds, Soft


Extremity:  Normal Inspection, No Pedal Edema


Neurologic/Psychiatric:  Alert, Disoriented


Skin:  Warm/Dry, Pallor





Results/Procedures


Lab


Laboratory Tests


5/24/22 05:37








Patient resulted labs reviewed.


Imaging:  Reviewed Imaging Report





Assessment/Plan


Assessment and Plan


Assess & Plan/Chief Complaint


Sepsis due to pneumonia


Possible aspiration pneumonia


Acute respiratory failure with hypoxia


UTI


Poor prognosis


   SIRS+ with fever and leukocytosis


   CXR with bibasilar infiltrates concerning for pneumonia


   Transition to Zosyn


   IV fluids


   UA concerning for UTI


   Urine culture with pansensitive E coli


   Requiring Vapotherm


   Consulted palliative care


   Discussed possibility of hospice care on discharge, family considering





Dysphagia


PEG dependence


Severe protein calorie malnutrition


   Meds per tube


   Dietary consulted


   Plan to resume tube feeds tomorrow





Dementia


   High risk for delirium


   Ativan as needed for agitation





Recent fall


Recent subdural hematoma


Recent humerus fracture


Debility


   PT/OT signed off due to inability to participate





DVT prophylaxis: Lovenox





Diagnosis/Problems


Diagnosis/Problems





(1) Sepsis


Status:  Acute


Qualifiers:  


   Sepsis type:  sepsis due to unspecified organism  Sepsis acute organ 

dysfunction status:  without acute organ dysfunction  Qualified Codes:  A41.9 - 

Sepsis, unspecified organism


(2) PNA (pneumonia)


Status:  Acute


(3) Acute respiratory failure with hypoxia


Status:  Acute


(4) Urinary tract infection


Status:  Acute


Qualifiers:  


   Urinary tract infection type:  site unspecified  Hematuria presence:  without

hematuria  Qualified Codes:  N39.0 - Urinary tract infection, site not specified


(5) PEG (percutaneous endoscopic gastrostomy) status


Status:  Chronic


(6) Dysphagia


Status:  Chronic


(7) Severe protein-calorie malnutrition


Status:  Chronic


(8) Dementia


Status:  Chronic


(9) Poor prognosis


Status:  Acute











MICHAEL KAY MD              May 24, 2022 17:40

## 2022-05-25 NOTE — PROGRESS NOTE - HOSPITALIST
Subjective


HPI/CC On Admission


Date Seen by Provider:  May 25, 2022


Time Seen by Provider:  09:00


Kely Kulkarni is an 87 year old female with PMH HTN, dementia, 

dysphagia s/p PEG placement, chronic indwelling constantino catheter, who presented 

with vomiting. She lives at [facility]. The day before she had pulled 

out her PEG and constantino. She had it replaced in the ER. Upon arrival this visit, 

she was found to be febrile. She is a poor historian and is unable to provide 

any meaningful history. Upon my exam, she reports pain in her left arm. She 

denies shortness of breath. She denies chest pain. She is only oriented to self.

There is no family at the bedside.


Subjective/Events-last exam


Pt appears to be very debilitated


Had an end of life conversation yesterday per Dr. Saini with the family


Hemoglobin was 7.1


Transferring to 4th floor


Pt required Vapotherm and a one on one sitter





Review of Systems


Neurological:  Confusion





Focused Exam


Lactate Level








Objective


Exam


Vital Signs





Vital Signs








  Date Time  Temp Pulse Resp B/P (MAP) Pulse Ox O2 Delivery O2 Flow Rate FiO2


 


5/26/22 06:15 37.2       


 


5/26/22 03:45  105 28 128/58 (81) 95 Vapotherm 40.00 





       20.00 


 


5/26/22 02:30        50





Capillary Refill : Less Than 3 Seconds


General Appearance:  No Apparent Distress, WD/WN, Chronically ill


Respiratory:  No Accessory Muscle Use, No Respiratory Distress, Decreased Breath

Sounds, Other (On Vapotherm)


Cardiovascular:  Regular Rate, Rhythm


Neurologic/Psychiatric:  Disoriented





Results/Procedures


Lab


Laboratory Tests


5/26/22 05:35








Patient resulted labs reviewed.


Imaging:  Reviewed Imaging Report





Assessment/Plan


Assessment and Plan


Assess & Plan/Chief Complaint


Assessment:


Sepsis


Pneumonia


Acute respiratory failure maintain on Vapotherm


UTI


Severe dementia


Severe poor prognosis


PEG tube in place





Plan:


Antibiotics


Vapotherm


DNR


Moved to fourth floor











JASON RODRIGUEZ DO                May 25, 2022 05:35

## 2022-05-25 NOTE — PHYSICAL THERAPY PROGRESS NOTE
Therapy Progress Note


Orders received for PT evaluation.  Patient evaluated and demonstrates PLOF on 

5-24-22.  Discussed with Nurse, Lilly, and Dr. Hewitt.  Both in agreement and no

further PT is needed at this time.











MICHAEL PARDO PT                May 25, 2022 10:00

## 2022-05-26 NOTE — PROGRESS NOTE - HOSPITALIST
Subjective


HPI/CC On Admission


Date Seen by Provider:  May 26, 2022


Time Seen by Provider:  11:00


Kely Kulkarni is an 87 year old female with PMH HTN, dementia, 

dysphagia s/p PEG placement, chronic indwelling constantino catheter, who presented 

with vomiting. She lives at [facility]. The day before she had pulled 

out her PEG and constantino. She had it replaced in the ER. Upon arrival this visit, 

she was found to be febrile. She is a poor historian and is unable to provide 

any meaningful history. Upon my exam, she reports pain in her left arm. She 

denies shortness of breath. She denies chest pain. She is only oriented to self.

There is no family at the bedside.


Subjective/Events-last exam


Pt is about the same


PEG tube feeding maintained


Antibiotics maintained


Pt needs to go on hospice at nursing home





Review of Systems


Neurological:  Confusion





Objective


Exam


Vital Signs





Vital Signs








  Date Time  Temp Pulse Resp B/P (MAP) Pulse Ox O2 Delivery O2 Flow Rate FiO2


 


5/27/22 02:59     96 Nasal Cannula 4.00 


 


5/27/22 00:00 37.3 105 20 153/81 (105)    


 


5/26/22 08:00        40





Capillary Refill : Less Than 3 Seconds


General Appearance:  No Apparent Distress, WD/WN, Chronically ill, Thin


Respiratory:  Lungs Clear


Cardiovascular:  Regular Rate, Rhythm


Neurologic/Psychiatric:  Alert, Disoriented





Results/Procedures


Lab


Laboratory Tests


5/26/22 05:35








Patient resulted labs reviewed.


Imaging:  Reviewed Imaging Report





Assessment/Plan


Assessment and Plan


Assess & Plan/Chief Complaint


Assessment:


Sepsis


Pneumonia


Acute respiratory failure maintain on Vapotherm


UTI


Severe dementia


Severe poor prognosis


PEG tube in place





Plan:


Antibiotics


Vapotherm


DNR


Moved to fourth floor





5/26/2022:


Supportive care


Move to [facility] tomorrow?


Needs hospice but daughter wants skilled











JASON RODRIGUEZ DO                May 26, 2022 06:31

## 2022-05-27 NOTE — DISCHARGE INST-SKILLED NURSING
Discharge Inst-Skilled NF


Reconcile Patient Problems


Problems Reviewed?:  Yes





Chief Complaint


Kely Kulkarni is an 87 year old female with PMH HTN, dementia, 

dysphagia s/p PEG placement, chronic indwelling constantino catheter, who presented 

with vomiting. She lives at [facility]. The day before she had pulled 

out her PEG and constantino. She had it replaced in the ER. Upon arrival this visit, 

she was found to be febrile. She is a poor historian and is unable to provide 

any meaningful history. Upon my exam, she reports pain in her left arm. She 

denies shortness of breath. She denies chest pain. She is only oriented to self.

There is no family at the bedside.





Patient Instructions


Patient Problems:  


Dentia


UTI





Consult/Follow Up/Orders


Follow Up Appt.:  


pcp nh rounds


Skilled NF Admit to:  [facility]


Certification (SNF)


I certify that SNF services are required to be given on an inpatient basis 

because of the above named patient's need for skilled nursing care on a 

continuing basis for the conditions(s) for which he/she was receiving inpatient 

hospital services prior to his/her transfer to the SNF.


Skilled Nursing Facility Order:  Nursing Services, Occupational Ther-Evaluate & 

Treat, Physical Therapy-Evaluate & Treat, Speech Language-Evaluate & Treat


Oxygen Delivery Method:  Nasal Cannula


Resuscitation Status:  Do Not Resuscitate


Type of Care:  Pallative Care





New & Resume Previous Orders


Continued Medications:  


Acetaminophen (Acetaminophen) 325 Mg/10.15 Ml Soln


10.15 ML PO Q6H PRN for PAIN-MILD (1-4), EA





Alendronate Sodium (Alendronate Sodium) 70 Mg Tablet


70 MG PO FRI, TAB





Amlodipine Besylate (Amlodipine Besylate) 5 Mg Tablet


5 MG PO DAILY, #30 TAB (This prescription has been renewed)


HOLD IF SBP <100 OR PULSE LESS 60


Aspirin (Aspirin) 81 Mg Tab.chew


81 MG PO DAILY, #30 TAB (This prescription has been renewed)





Carboxymethylcellulose Sodium (Refresh Tears) 0.5 % Drops


1 DROP OU DAILY, DROPS





Cholecalciferol (Vitamin D3) (Vitamin D3) 125 Mcg (5000 Unit) Capsule


125 MCG PO DAILY, CAP





Doxazosin Mesylate (Doxazosin Mesylate) 1 Mg Tablet


1 MG PO DAILY, #30 TAB (This prescription has been renewed)





Multivitamin (Multivitamin) 1 Each Tablet


1 EACH PO DAILY, TAB





Oxycodone HCl (Oxycodone HCl) 5 Mg/5 Ml Solution


5 ML GT Q4H PRN for PAIN-SEVERE (8-10), #60 ML (This prescription has been 

renewed)





Polyethylene Glycol 3350 (Miralax) 17 Gram Powd.pack


17 GM PO HS, EACH





Polymyxin B Sulf/Trimethoprim (Polytrim Eye Drops) 10,000 Unit-1 Mg/Ml Drops


1 DROP OU TID, DROPS





Sennosides/Docusate Sodium (Senna Plus Tablet) 8.6 Mg-50 Mg Tablet


1 EACH PO BID, TAB





 


Discontinued Medications:  


Omega-3 Fatty Acids/Fish Oil (Fish Oil 1,200 mg Softgel) 360 Mg-1,200 Mg Capsule


1 EACH PO DAILY, CAP





Quetiapine Fumarate (Seroquel) 25 Mg Tablet


12.5 MG PO Q6H PRN for ANTIPSYCHOTIC DEPRESSION, TAB


TAKES  OF A 25MG


Quetiapine Fumarate (Quetiapine Fumarate) 25 Mg Tablet


25 MG PO HS, TAB








Rachelle Hewitt 


May 27, 2022 


11:14











RACHELLE HEWITT DO                May 27, 2022 11:15

## 2022-05-27 NOTE — DISCHARGE SUMMARY
Discharge Summary


Hospital Course


Was the Problem List Reviewed?:  Yes


Problems/Dx:  


(1) Sepsis


Status:  Acute


Qualifiers:  


   Qualified Codes:  A41.9 - Sepsis, unspecified organism


(2) PNA (pneumonia)


Status:  Acute


(3) Acute respiratory failure with hypoxia


Status:  Acute


(4) Urinary tract infection


Status:  Acute


Qualifiers:  


   Qualified Codes:  N39.0 - Urinary tract infection, site not specified


(5) PEG (percutaneous endoscopic gastrostomy) status


Status:  Chronic


(6) Dysphagia


Status:  Chronic


(7) Severe protein-calorie malnutrition


Status:  Chronic


(8) Dementia


Status:  Chronic


(9) Poor prognosis


Status:  Acute


Hospital Course


Date of Admission: May 22, 2022 at 19:21 


Admission Diagnosis :  





Family Physician/Provider: Bryant Hare DO  





Date of Discharge: 22 


Discharge Diagnosis: Status post UTI, dementia, PEG tube








Hospital Course:


Pt had a lengthy hospital course after she was admitted for nausea, vomiting, 

and UTI. Pt was found to have a PEG tube placed and maintained. She had profound

dementia. Acquired one on one sitter and anti psychotics. Pt completed 

antibiotics and was deemed stable for nursing home discharge. Overall poor 

prognosis remains. 














Labs and Pending Lab Test:


Laboratory Tests


22 07:29: 


White Blood Count 6.8, Red Blood Count 2.53L, Hemoglobin 7.5L, Hematocrit 24L, 

Mean Corpuscular Volume 94, Mean Corpuscular Hemoglobin 30, Mean Corpuscular 

Hemoglobin Concent 32, Red Cell Distribution Width 15.0H, Platelet Count 209, 

Mean Platelet Volume 9.4, Immature Granulocyte % (Auto) 1, Neutrophils (%) 

(Auto) 65, Lymphocytes (%) (Auto) 19, Monocytes (%) (Auto) 12, Eosinophils (%) 

(Auto) 3, Basophils (%) (Auto) 0, Neutrophils # (Auto) 4.4, Lymphocytes # (Auto)

1.3, Monocytes # (Auto) 0.8, Eosinophils # (Auto) 0.2, Basophils # (Auto) 0.0, 

Immature Granulocyte # (Auto) 0.1, Sodium Level 139, Potassium Level 3.7, 

Chloride Level 104, Carbon Dioxide Level 26, Anion Gap 9, Blood Urea Nitrogen 

16, Creatinine 0.77, Estimat Glomerular Filtration Rate 75, BUN/Creatinine Ratio

21, Glucose Level 132H, Calcium Level 8.4L, Corrected Calcium 9.7, Total 

Bilirubin 0.5, Aspartate Amino Transf (AST/SGOT) 17, Alanine Aminotransferase 

(ALT/SGPT) 18, Alkaline Phosphatase 69, Total Protein 5.1L, Albumin 2.4L





Microbiology


22 Urine Culture - Final, Complete


          Escherichia coli


22 Blood Culture - Preliminary, Resulted


          No growth





Home Meds


Active


Doxazosin Mesylate 1 Mg Tablet 1 Mg PO DAILY


Aspirin 81 Mg Tab.chew 81 Mg PO DAILY


Amlodipine Besylate 5 Mg Tablet 5 Mg PO DAILY


     HOLD IF SBP <100 OR PULSE LESS 60


Oxycodone HCl 5 Mg/5 Ml Solution 5 Ml GT Q4H PRN


Reported


Acetaminophen 325 Mg/10.15 Ml Soln 10.15 Ml PO Q6H PRN


Vitamin D3 (Cholecalciferol (Vitamin D3)) 125 Mcg (5000 Unit) Capsule 125 Mcg PO

DAILY


Multivitamin 1 Each Tablet 1 Each PO DAILY


Polytrim Eye Drops (Polymyxin B Sulf/Trimethoprim) 10,000 Unit-1 Mg/Ml Drops 1 

Drop OU TID


Senna Plus Tablet (Sennosides/Docusate Sodium) 8.6 Mg-50 Mg Tablet 1 Each PO BID


Refresh Tears (Carboxymethylcellulose Sodium) 0.5 % Drops 1 Drop OU DAILY


Quetiapine Fumarate 25 Mg Tablet 25 Mg PO HS


Seroquel (Quetiapine Fumarate) 25 Mg Tablet 12.5 Mg PO Q6H PRN


     TAKES  OF A 25MG


Miralax (Polyethylene Glycol 3350) 17 Gram Powd.pack 17 Gm PO HS


Fish Oil 1,200 mg Softgel (Omega-3 Fatty Acids/Fish Oil) 360 Mg-1,200 Mg Capsule

1 Each PO DAILY


Alendronate Sodium 70 Mg Tablet 70 Mg PO FRI


Assessment/Pt Instructions


Nursing home rounds


Discharge Planning:  <30 minutes discharge planning





Discharge Instructions


Discharge Diet:  Tube Feeding


Activity as Tolerated:  Yes





Discharge Physical Examination


Vital Signs





Vital Signs








  Date Time  Temp Pulse Resp B/P (MAP) Pulse Ox O2 Delivery O2 Flow Rate FiO2


 


22 10:56     92 Nasal Cannula 1.00 


 


22 08:20 37.0 90 20 140/74 (96)    


 


22 08:00        40








General Appearance:  No Apparent Distress, WD/WN, Chronically ill


Allergies:  


Coded Allergies:  


     melatonin (Verified  Allergy, Unknown, 22)


     quetiapine (Verified  Adverse Reaction, Unknown, 2/11/19)


   confusion





Discharge Summary


Date of Admission


May 22, 2022 at 19:21


Date of Discharge





Discharge Date:  May 27, 2022


Admission Diagnosis


Sepsis due to pneumonia


Comfort Measures/


End of Life Care:  Pallative Care





Discharge Diagnosis


Assessment:


Sepsis


Pneumonia


Acute respiratory failure maintain on Vapotherm


UTI


Severe dementia


Severe poor prognosis


PEG tube in place





Plan:


Antibiotics


Vapotherm


DNR


Moved to fourth floor





2022:


Supportive care


Move to [facility] tomorrow?


Needs hospice but daughter wants skilled





(1) Sepsis


Status:  Acute


Qualifiers:  


   Qualified Codes:  A41.9 - Sepsis, unspecified organism


(2) PNA (pneumonia)


Status:  Acute


(3) Acute respiratory failure with hypoxia


Status:  Acute


(4) Urinary tract infection


Status:  Acute


Qualifiers:  


   Qualified Codes:  N39.0 - Urinary tract infection, site not specified


(5) PEG (percutaneous endoscopic gastrostomy) status


Status:  Chronic


(6) Dysphagia


Status:  Chronic


(7) Severe protein-calorie malnutrition


Status:  Chronic


(8) Dementia


Status:  Chronic


(9) Poor prognosis


Status:  Acute











JASON RODRIGUEZ DO                May 27, 2022 11:15

## 2022-06-07 NOTE — DIAGNOSTIC IMAGING REPORT
INDICATION:  Back pain.



COMPARISON: CT dated 02/11/2019.



FINDINGS: Frontal and lateral views of the lumbar spine were

obtained. Alignment and vertebral heights are maintained. There

is no fracture or destructive process. Mild multilevel

degenerative disease is noted in the lumbar spine. Limited views

of the abdomen demonstrate nonobstructive bowel gas pattern. 



IMPRESSION: 

1. No acute fracture or dislocation of the lumbar spine.

2. Mild multilevel degenerative changes.



Dictated by: 



  Dictated on workstation # [id number]
INDICATION: Back pain.



COMPARISON: CT dated 02/11/2019.



FINDINGS: Frontal and lateral views of the thoracic spine were

obtained. Visualization of the upper thoracic spine is limited on

the lateral projection. Alignment and vertebral heights are

maintained. There is no fracture or destructive process. There

are no large paraspinal masses. Mild degenerative disease is

noted in the thoracic spine. Limited views of the lungs are

clear.



IMPRESSION:

1. No acute fracture or dislocation of the thoracic spine.

2. Mild multilevel degenerative changes.



Dictated by: 



  Dictated on workstation # [id number]
[id number]

## 2022-06-13 NOTE — DIAGNOSTIC IMAGING REPORT
INDICATION: 

Dysphagia.



TECHNIQUE: 

The procedure was performed in conjunction with Speech Pathology.

Video fluoroscopy was performed during the swallowing of barium

in multiple consistencies. A total of 1.3 minutes of fluoroscopic

time was utilized.



FINDINGS:

The patient ingested thin barium as well as nectar and honey

consistencies. The patient also ingested applesauce consistency.

There was deep laryngeal penetration during the swallowing of

thin barium as well as the nectar and honey consistencies. Honey

consistency penetration was seen during the second swallow and

was felt to most likely arise from residue within the valleculae.

Applesauce consistency was without evidence of penetration or

aspiration.



IMPRESSION: 

Abnormal video swallow demonstrating penetration during the

swallowing of thin, nectar, and honey thickened consistencies.

Penetration was noted to the level of the cords. No definite

aspiration was visualized.



Dictated by: 



  Dictated on workstation # [id number]

## 2022-07-21 NOTE — DIAGNOSTIC IMAGING REPORT
Indication: Left shoulder pain



AP, oblique, and transscapular views of the left shoulder are

obtained and compared to 05/03/2022.



Compared to the prior study, there is some ongoing healing of the

patient's proximal humeral fracture. There is moderate

displacement of fracture fragments noted on the lateral view.

Some callus formation is compatible with ongoing healing, the

alignment is improved compared to 05/03/2022 however.



Impression:  A subacute fracture of left proximal humerus with

signs of ongoing healing, there is improved alignment compared to

05/03/2022 with some residual mild displacement.



Dictated by: 



  Dictated on workstation # [id number]

## 2022-07-21 NOTE — DIAGNOSTIC IMAGING REPORT
INDICATION:  87-year-old female, status post fall, nonverbal but

with abrasion and pain.



TECHNIQUE:  3 views of the left elbow  



CORRELATION STUDY:  None



FINDINGS: 

Suboptimal positioning. Very questionable lucency along the most

medial aspect of the proximal ulna which a subtle nondisplaced

fracture is not excluded. Radial head unremarkable. Overall

alignment grossly anatomic. No abnormal joint effusion.



IMPRESSION: 

1. Suspect nondisplaced fracture at the medial aspect proximal

ulna. Remainder of the osseous structures otherwise intact and

normal alignment.



Dictated by: 



  Dictated on workstation # [id number]

## 2022-07-21 NOTE — ED FALL/INJURY
General


Stated Complaint:  FALL


Source:  patient


Exam Limitations:  no limitations





History of Present Illness


Date Seen by Provider:  Jul 21, 2022


Time Seen by Provider:  13:14


Initial Comments


Patient is a 87-year-old female who presents ED for fall.  Patient is a resident

at [facility].  Patient is evaluated for a unwitnessed fall.  Patient 

states she was attempting to get out of her bed.  Patient has bruising above her

left eye and left upper arm.  Patient has no current complaints but did complain

of shoulder pain and head pain to staff.  Patient was not placed in c-collar.  

She has no cervical midline tenderness.  She has a superficial skin abrasion to 

the left periorbit and abrasion to left lateral elbow.  According to EMS and 

staff at [facility] she is at her current mentation.  Patient does not 

appear to be on a blood thinner.  Patient is able to ambulate with assistance of

but needs assistance to get out of bed.  Patient does have a feeding tube.  No 

fever, cough, vomiting, diarrhea, visual changes.  Chronic weakness





Allergies and Home Medications


Allergies


Coded Allergies:  


     melatonin (Verified  Allergy, Unknown, 5/22/22)


     quetiapine (Verified  Adverse Reaction, Unknown, 2/11/19)


   confusion





Patient Home Medication List


Home Medication List Reviewed:  Yes


Acetaminophen (Acetaminophen) 325 Mg/10.15 Ml Soln, 10.15 ML PO Q6H PRN for 

PAIN-MILD (1-4), (Reported)


   Entered as Reported by: MEE ROMAN on 5/24/22 0851


Alendronate Sodium (Alendronate Sodium) 70 Mg Tablet, 70 MG PO FRI, (Reported)


   Entered as Reported by: MEE ROMAN on 5/24/22 0851


Amlodipine Besylate (Amlodipine Besylate) 5 Mg Tablet, 5 MG PO DAILY


   Prescribed by: JASON RODRIGUEZ on 5/27/22 1113


Aspirin (Aspirin) 81 Mg Tab.chew, 81 MG PO DAILY


   Prescribed by: JASON RODRIGUEZ on 5/27/22 1113


Carboxymethylcellulose Sodium (Refresh Tears) 0.5 % Drops, 1 DROP OU DAILY, 

(Reported)


   Entered as Reported by: MEE ROMAN on 5/24/22 0851


Cholecalciferol (Vitamin D3) (Vitamin D3) 125 Mcg (5000 Unit) Capsule, 125 MCG 

PO DAILY, (Reported)


   Entered as Reported by: MEE ROMAN on 5/24/22 0851


Doxazosin Mesylate (Doxazosin Mesylate) 1 Mg Tablet, 1 MG PO DAILY


   Prescribed by: JASON RODRIGUEZ on 5/27/22 1113


Multivitamin (Multivitamin) 1 Each Tablet, 1 EACH PO DAILY, (Reported)


   Entered as Reported by: MEE ROMAN on 5/24/22 0851


Oxycodone HCl (Oxycodone HCl) 5 Mg/5 Ml Solution, 5 ML GT Q4H PRN for PAIN-

SEVERE (8-10)


   Prescribed by: JASON RODRIGUEZ on 5/27/22 1114


Polyethylene Glycol 3350 (Miralax) 17 Gram Powd.pack, 17 GM PO HS, (Reported)


   Entered as Reported by: MEE ROMAN on 5/24/22 0851


Polymyxin B Sulf/Trimethoprim (Polytrim Eye Drops) 10,000 Unit-1 Mg/Ml Drops, 1 

DROP OU TID, (Reported)


   Entered as Reported by: MEE ROMAN on 5/24/22 0851


Sennosides/Docusate Sodium (Senna Plus Tablet) 8.6 Mg-50 Mg Tablet, 1 EACH PO 

BID, (Reported)


   Entered as Reported by: MEE ROMAN on 5/24/22 0851





Review of Systems


Review of Systems


Constitutional:  No chills, No diaphoresis


Eyes:  Denies Blurred Vision, Denies Drainage, Denies Decreased Acuity


Ears, Nose, Mouth, Throat:  denies ear pain, denies ear discharge


Respiratory:  No cough, No dyspnea on exertion


Cardiovascular:  No chest pain


Gastrointestinal:  No abdominal pain, No diarrhea, No nausea, No vomiting


Musculoskeletal:  No back pain; joint pain, muscle pain


Skin:  change in color





All Other Systems Reviewed


Negative Unless Noted:  Yes





Past Medical-Social-Family Hx


Immunizations Up To Date


Tetanus Booster (TDap):  Unknown


PED Vaccines UTD:  No


First/Initial COVID19 Vaccinat:  YES


Second COVID19 Vaccination Brayden:  YES


Third COVID19 Vaccination Date:  YES





Seasonal Allergies


Seasonal Allergies:  No





Past Medical History


Surgeries:  Yes


Abdominal


Respiratory:  No


Currently Using CPAP:  No


Currently Using BIPAP:  No


Cardiac:  Yes (SEES DR. BUSBY UNSURE WHY)


Hypertension


Neurological:  Yes


Dementia


Reproductive Disorders:  No


Female Reproductive Disorders:  Denies


Sexually Transmitted Disease:  No


HIV/AIDS:  No


Genitourinary:  Yes


UTI-Chronic


Gastrointestinal:  No


Musculoskeletal:  Yes


Fractures, Gout


Endocrine:  Yes


Hyperthyroidism


HEENT:  Yes


Cataract


Loss of Vision:  Denies


Hearing Impairment:  Hard of Hearing


Cancer:  No


Psychosocial:  No


Integumentary:  No


Recent Skin Changes


Blood Disorders:  No


Adverse Reaction/Blood Tranf:  No





Family Medical History





Dementia


  G8 SISTER


Diabetes mellitus


  19 FATHER


No Pertinent Family Hx, Diabetes, Psychiatric Problems





Physical Exam


Vital Signs





Vital Signs - First Documented




















Capillary Refill :


Height, Weight, BMI


Height: 5'4.00"


Weight: 117lbs. 8.0oz. 53.020376md; 18.06 BMI


Method:Stated


General Appearance:  WD/WN, no apparent distress


HEENT:  PERRL/EOMI, normal ENT inspection, TMs normal, pharynx normal, other 

(Swelling and skin abrasion to the left upper lateral periorbit.)


Neck:  non-tender, full range of motion, supple


Cardiovascular:  regular rate, rhythm, no edema, no gallop


Respiratory:  chest non-tender, lungs clear, normal breath sounds, no 

respiratory distress, no accessory muscle use


Gastrointestinal:  normal bowel sounds, non tender, soft, no organomegaly


Back:  normal inspection, no CVA tenderness, no vertebral tenderness


Extremities:  normal range of motion, other (Abrasion to left lateral elbow.  No

arm tenderness.  Decreased range of motion of the upper extremities.   

strength 5 out of 5.  No bruising, swelling or tenderness to the lower 

extremity.)


Skin:  ecchymosis (Left lateral periorbit.)





[place] Coma Score


Best Eye Response:  (4) Open Spontaneously


Best Verbal Response:  (4) Confused Conversation


Best Motor Response:  (6) Obeys Commands


[place] Total:  14





Procedures/Interventions





   Suture Size:  5-0





Progress/Results/Core Measures


Results/Orders


My Orders





Orders - URIEL SCHMID


Ct Head/Face/Cervical Wo (7/21/22 13:13)


Shoulder, Left, 3 Views (7/21/22 13:13)


Elbow, Left, 3 Views (7/21/22 13:13)





Vital Signs/I&O











 7/21/22 7/21/22





 13:08 13:08


 


Temp 36.7 36.7


 


Pulse 78 78


 


Resp 14 14


 


B/P (MAP) 135/93 (107) 135/93 (107)


 


Pulse Ox 100 100


 


O2 Delivery Room Air Room Air











Departure


Communication (PCP)


Unwitnessed fall out of bed.  Was found on the floor.  She has a contusion with 

a small skin abrasion to the left periorbit.  No sutures needed.  XR movements 

intact.  Patient baseline mentation is confusion.  She was able to respond to 

some mild pain to the left arm.  She has a small skin tear to her left lateral 

elbow.  She has no significant pain on palpation.  History of left humerus 

fracture currently being managed on conservatively.  She did have a subtle 

lucency through the proximal ulna cannot rule out fracture.  Patient would not 

tolerate splint or sling.  Recommend sling for comfort and to follow-up with 

orthopedic in 7 to 10 days for reevaluation.  This will also be treated 

conservatively if fracture.  CT scan of the head, face and cervical neck was 

unremarkable.  She has no other current baseline.  Was able to respond to some 

questions.  According to staff she is at her current normal mentation.  Has been

eating fine at home.  No cough, fever, increased confusion, diarrhea, vomiting. 

Patient will be discharged back to the [facility].





Impression





   Primary Impression:  


   Facial injury


   Additional Impression:  


   Arm pain


Disposition:  01 HOME, SELF-CARE


Condition:  Stable





Departure-Patient Inst.


Decision time for Depature:  14:28


Referrals:  


ERICA CEDILLO MD, WILLIAM J DO (PCP/Family)


Primary Care Physician


Patient Instructions:  Contusion (DC), Shoulder Pain ED





Add. Discharge Instructions:  


Subacute fracture of the left humerus.  Possible lucency to the left proximal 

ulna cannot rule out a subtle fracture.  Orthopedic outpatient follow-up in 7 to

10 days for reevaluation.











URIEL SCHMID          Jul 21, 2022 13:18

## 2022-07-21 NOTE — DIAGNOSTIC IMAGING REPORT
INDICATION: Fall with injury to head, neck and facial pain.



TECHNIQUE: Multiple contiguous axial images were obtained through

the head, neck, and facial bones without the use of intravenous

contrast. Sagittal and coronal reformations through the cervical

spine and facial bones were also performed. Auto Exposure

Controls were utilized during the CT exam to meet ALARA standards

for radiation dose reduction. 



COMPARISON is made with 05/03/2022



CTA HEAD FINDINGS:

There are diffuse atrophic changes and chronic ischemic changes

in the deep white matter. There is no acute intracranial

hemorrhage or subdural or epidural collection. Old lacunar

infarct in the left paraventricular white matter and left

thalamus are noted. There is no acute appearing intracranial

finding. Calvarial windows were unremarkable.



CT MAXILLOFACIAL FINDINGS:

There is retention cyst or polyp in the right frontal sinus.

Remaining sinuses are well-aerated. There is no acute fracture of

the facial bones. Orbital contents are unremarkable.



CT CERVICAL SPINE FINDINGS:

No fracture or acute bony abnormality is seen. There are diffuse

degenerative changes throughout the facet joints. There is slight

anterolisthesis of C4 on C5 and slight retrolisthesis of C5 on

C6.



IMPRESSION:



CT brain shows extensive chronic changes as above with no acute

intracranial abnormality or calvarial fracture. 



CT maxillofacial demonstrates no facial fracture. There is

incidental retention cyst or polyp in the right frontal sinus. 



CT cervical spine shows degenerative findings as described above

with no acute abnormality.



Dictated by: 



  Dictated on workstation # [id number]